# Patient Record
Sex: FEMALE | Race: WHITE | NOT HISPANIC OR LATINO | Employment: OTHER | URBAN - METROPOLITAN AREA
[De-identification: names, ages, dates, MRNs, and addresses within clinical notes are randomized per-mention and may not be internally consistent; named-entity substitution may affect disease eponyms.]

---

## 2023-03-27 ENCOUNTER — HOSPITAL ENCOUNTER (OUTPATIENT)
Dept: DATA CONVERSION | Facility: HOSPITAL | Age: 78
End: 2023-03-27
Attending: INTERNAL MEDICINE
Payer: MEDICARE

## 2023-03-27 DIAGNOSIS — K21.00 GASTRO-ESOPHAGEAL REFLUX DISEASE WITH ESOPHAGITIS, WITHOUT BLEEDING: ICD-10-CM

## 2023-03-27 DIAGNOSIS — K59.00 CONSTIPATION, UNSPECIFIED: ICD-10-CM

## 2023-03-27 DIAGNOSIS — K21.9 GASTRO-ESOPHAGEAL REFLUX DISEASE WITHOUT ESOPHAGITIS: ICD-10-CM

## 2023-03-27 DIAGNOSIS — E78.00 PURE HYPERCHOLESTEROLEMIA, UNSPECIFIED: ICD-10-CM

## 2023-03-27 DIAGNOSIS — R10.9 UNSPECIFIED ABDOMINAL PAIN: ICD-10-CM

## 2023-03-27 DIAGNOSIS — Z12.11 ENCOUNTER FOR SCREENING FOR MALIGNANT NEOPLASM OF COLON: ICD-10-CM

## 2023-03-27 DIAGNOSIS — E07.9 DISORDER OF THYROID, UNSPECIFIED: ICD-10-CM

## 2023-03-27 DIAGNOSIS — E78.5 HYPERLIPIDEMIA, UNSPECIFIED: ICD-10-CM

## 2023-03-27 DIAGNOSIS — K57.30 DIVERTICULOSIS OF LARGE INTESTINE WITHOUT PERFORATION OR ABSCESS WITHOUT BLEEDING: ICD-10-CM

## 2023-03-27 DIAGNOSIS — Z87.891 PERSONAL HISTORY OF NICOTINE DEPENDENCE: ICD-10-CM

## 2023-03-27 DIAGNOSIS — K22.89 OTHER SPECIFIED DISEASE OF ESOPHAGUS: ICD-10-CM

## 2023-03-27 DIAGNOSIS — J45.909 UNSPECIFIED ASTHMA, UNCOMPLICATED (HHS-HCC): ICD-10-CM

## 2023-11-13 ENCOUNTER — HOSPITAL ENCOUNTER (OUTPATIENT)
Dept: VASCULAR MEDICINE | Facility: HOSPITAL | Age: 78
Discharge: HOME | End: 2023-11-13
Payer: MEDICARE

## 2023-11-13 DIAGNOSIS — R22.42 LOCALIZED SWELLING, MASS AND LUMP, LEFT LOWER LIMB: ICD-10-CM

## 2023-11-13 DIAGNOSIS — R60.1 GENERALIZED EDEMA: ICD-10-CM

## 2023-11-13 PROCEDURE — 93971 EXTREMITY STUDY: CPT | Performed by: SURGERY

## 2023-11-13 PROCEDURE — 93971 EXTREMITY STUDY: CPT

## 2023-11-30 ENCOUNTER — ANCILLARY PROCEDURE (OUTPATIENT)
Dept: RADIOLOGY | Facility: CLINIC | Age: 78
End: 2023-11-30
Payer: MEDICARE

## 2023-11-30 DIAGNOSIS — M79.605 PAIN IN LEFT LEG: ICD-10-CM

## 2023-11-30 DIAGNOSIS — R26.89 OTHER ABNORMALITIES OF GAIT AND MOBILITY: ICD-10-CM

## 2023-11-30 DIAGNOSIS — G83.10: ICD-10-CM

## 2023-11-30 PROCEDURE — 72148 MRI LUMBAR SPINE W/O DYE: CPT

## 2023-11-30 PROCEDURE — 72148 MRI LUMBAR SPINE W/O DYE: CPT | Performed by: RADIOLOGY

## 2023-12-01 ENCOUNTER — APPOINTMENT (OUTPATIENT)
Dept: UROLOGY | Facility: HOSPITAL | Age: 78
End: 2023-12-01
Payer: MEDICARE

## 2023-12-12 ENCOUNTER — OFFICE VISIT (OUTPATIENT)
Dept: NEUROSURGERY | Facility: CLINIC | Age: 78
End: 2023-12-12
Payer: MEDICARE

## 2023-12-12 ENCOUNTER — HOSPITAL ENCOUNTER (OUTPATIENT)
Dept: RADIOLOGY | Facility: HOSPITAL | Age: 78
Discharge: HOME | End: 2023-12-12
Payer: MEDICARE

## 2023-12-12 VITALS
TEMPERATURE: 97 F | DIASTOLIC BLOOD PRESSURE: 72 MMHG | WEIGHT: 195 LBS | SYSTOLIC BLOOD PRESSURE: 107 MMHG | HEART RATE: 63 BPM | BODY MASS INDEX: 30.61 KG/M2 | HEIGHT: 67 IN

## 2023-12-12 DIAGNOSIS — M25.562 LEFT KNEE PAIN, UNSPECIFIED CHRONICITY: ICD-10-CM

## 2023-12-12 DIAGNOSIS — M25.562 LEFT KNEE PAIN, UNSPECIFIED CHRONICITY: Primary | ICD-10-CM

## 2023-12-12 DIAGNOSIS — M51.16 LUMBAR DISC DISEASE WITH RADICULOPATHY: ICD-10-CM

## 2023-12-12 PROCEDURE — 99203 OFFICE O/P NEW LOW 30 MIN: CPT | Performed by: NURSE PRACTITIONER

## 2023-12-12 PROCEDURE — 73562 X-RAY EXAM OF KNEE 3: CPT | Mod: LEFT SIDE | Performed by: STUDENT IN AN ORGANIZED HEALTH CARE EDUCATION/TRAINING PROGRAM

## 2023-12-12 PROCEDURE — 72110 X-RAY EXAM L-2 SPINE 4/>VWS: CPT | Performed by: STUDENT IN AN ORGANIZED HEALTH CARE EDUCATION/TRAINING PROGRAM

## 2023-12-12 PROCEDURE — 73562 X-RAY EXAM OF KNEE 3: CPT | Mod: LT,FY

## 2023-12-12 PROCEDURE — 72120 X-RAY BEND ONLY L-S SPINE: CPT

## 2023-12-12 PROCEDURE — 1036F TOBACCO NON-USER: CPT | Performed by: NURSE PRACTITIONER

## 2023-12-12 PROCEDURE — 1159F MED LIST DOCD IN RCRD: CPT | Performed by: NURSE PRACTITIONER

## 2023-12-12 PROCEDURE — 99213 OFFICE O/P EST LOW 20 MIN: CPT | Performed by: NURSE PRACTITIONER

## 2023-12-12 PROCEDURE — 1125F AMNT PAIN NOTED PAIN PRSNT: CPT | Performed by: NURSE PRACTITIONER

## 2023-12-12 RX ORDER — ALPRAZOLAM 0.5 MG/1
0.5 TABLET ORAL
COMMUNITY
Start: 2023-04-20

## 2023-12-12 RX ORDER — MELATONIN 10 MG/ML
DROPS ORAL
COMMUNITY

## 2023-12-12 RX ORDER — ESTRADIOL 0.1 MG/G
CREAM VAGINAL
COMMUNITY
Start: 2022-03-28

## 2023-12-12 NOTE — PROGRESS NOTES
Chief Complaint:   Irasema Jaime is a 78 y.o. year old woman here for lower back pain.    HPI  Irasema Jaime is a very nice 78 year old female with Hx of osteopenia, myalgia, remote L3 compression fracture who has acute lower back pain radiating down LLE that started in early November 2023. Onset sudden and severe, causing pain so severe she couldn't walk. She still has trouble getting up steps. She also reports left knee pain that contributes to her mobility difficulties, sometimes feels as though her left knee is giving out or will buckle. She denies bowel or bladder dysfunction, weakness, or saddle anesthesia. She take NSAID occasionally without relief.     Review of Systems  All other systems reviewed and negative other than what is already stated in this note.      Past Medical History:   Diagnosis Date    Acute pharyngitis, unspecified     Sore throat    Personal history of other diseases of the digestive system     History of esophageal reflux       There is no problem list on file for this patient.      Past Surgical History:   Procedure Laterality Date    CHOLECYSTECTOMY  08/25/2015    Cholecystectomy       No family history on file.    Social History     Tobacco Use    Smoking status: Never    Smokeless tobacco: Never   Substance Use Topics    Alcohol use: Never    Drug use: Never         Current Outpatient Medications:     ALPRAZolam (Xanax) 0.5 mg tablet, Take 1 tablet (0.5 mg) by mouth., Disp: , Rfl:     cholecalciferol, vitamin D3, 50 mcg (2,000 unit) tablet,chewable, Chew., Disp: , Rfl:     estradiol (Estrace) 0.01 % (0.1 mg/gram) vaginal cream, Apply a pea size amount to finger and place into vaginal opening every Monday, Wednesday, and Friday., Disp: , Rfl:         Objective   Vitals:    12/12/23 1411   BP: 107/72   Pulse: 63   Temp: 36.1 °C (97 °F)     Exam  no acute distress, well developed woman appearing her  stated age  normal sclera  moist mucus membranes  no peripheral edema   symmetric chest  rise  nondistended abdomen  alert and oriented, pupils equal and round, extraocular movements intact.  Full strength in all extremities, normal sensation to light touch throughout, normal symmetric reflexes  left SI joint and left knee tenderness to palpation   normal mood    Study Result    Narrative & Impression   Interpreted By:  Adali Murray,   STUDY:  MRI of the lumbar spine without IV contrast;  11/30/2023 7:10 pm      INDICATION:  Signs/Symptoms:PAIN.      COMPARISON:  None.      ACCESSION NUMBER(S):  [id number]      ORDERING CLINICIAN:  TJ SANDS      TECHNIQUE:  Sagittal and axial STIR and T1-weighted MRI images of the lumbar  spine were acquired using a spondylolysis protocol.  No contrast was  administered.      FINDINGS:  For counting purposes the last lumbarized vertebral body is labeled  L5. Alignment, vertebral body heights and marrow signal pattern are  within normal limits. There is desiccated disc signal throughout the  lumbar spine with mild disc height loss at L5-S1. The conus  terminates at L1 and is unremarkable. Evaluation of the paraspinal  soft tissues is unremarkable.      Evaluation by level:      T12-L1: No spinal canal or neural foraminal stenosis.      T12-L1: No spinal canal or neural foraminal stenosis.      L1-L2: No spinal canal or neural foraminal stenosis.      L2-L3: Mild disc bulge and facet arthrosis. Mild spinal canal  stenosis. Mild neural foraminal stenosis.      L3-L4: Disc bulge, facet arthrosis and ligamentum flavum thickening.  Mild-to-moderate spinal canal stenosis, narrowing of the subarticular  recess and mild bilateral neural foraminal stenosis.      L4-L5: Disc bulge, facet arthrosis and ligamentum flavum thickening.  Mild spinal canal stenosis, mild narrowing of the subarticular recess  and mild bilateral neural foraminal stenosis.      L5-S1: No significant spinal canal or neural foraminal stenosis.      IMPRESSION:  Multilevel degenerative disc disease and  facet arthrosis most  pronounced at L3-L4 with mild-to-moderate spinal canal and mild  bilateral neural foraminal stenosis.      I personally reviewed the images/study and I agree with the findings  as stated. This study was interpreted at [facility], [place], [place].      MACRO:  None      Signed by: Adali Murray 12/1/2023 11:15 AM  Dictation workstation:   [id number]       Assessment/Plan   There were no encounter diagnoses.  And Addison is a very nice 78-year-old woman here for evaluation of acute lower back pain started about 4 weeks ago.  She saw her primary care provider who got an MRI dated 11/30/2023, this was personally reviewed and demonstrates some mild to moderate spinal canal stenosis at L3-4 with bilateral neural foraminal narrowing secondary to broad-based disc bulge, as well as L4-5 mild spinal canal stenosis and neural foraminal narrowing bilaterally secondary to broad-based disc bulge.  Exam is within normal limits, no weakness or loss of sensation.  She does exhibit some point tenderness at the left SI joint as well as on palpation of her left kneeAt this time recommend conservative medical therapies including physical therapy, pain management referral, OTC NSAIDs/Tylenol/topical analgesic medications.  Plan:  -Conservative medical therapies as above  -Referral placed to PT and PM  -Standing upright with flexion extension x-rays of lumbar spine today  -left knee x-rays today  -Return visit if conservative therapies do not provide relief or in fact her symptoms worsen.      Nuris Perry, APRN-CNP     This note was created in part after personal review of documents in EMR including recent labs and available radiologic imaging. Total time spent in review of EMR, relevant imaging, time with patient and completion of this document is 30 minutes.

## 2023-12-12 NOTE — PATIENT INSTRUCTIONS
I recommend conservative medical management at this time for multiple problems including lumbar radiculopathy, sacroiliitis, and left knee pain.  This includes over-the-counter Tylenol and naproxen sodium twice a day, topical pain relief medications.    I referred you to physical therapy and pain management.  We will get lumbar spine and left knee x-rays today, I will call you if there is anything concerning.  Results should be available on [facility].  Please return for follow-up visit if your symptoms do not improve with this conservative management or in fact they worsen.   Nuris Perry, APRN-CNP

## 2023-12-18 ENCOUNTER — APPOINTMENT (OUTPATIENT)
Dept: ORTHOPEDIC SURGERY | Facility: HOSPITAL | Age: 78
End: 2023-12-18
Payer: MEDICARE

## 2023-12-19 ENCOUNTER — APPOINTMENT (OUTPATIENT)
Dept: UROLOGY | Facility: CLINIC | Age: 78
End: 2023-12-19
Payer: MEDICARE

## 2024-01-08 ENCOUNTER — OFFICE VISIT (OUTPATIENT)
Dept: PAIN MEDICINE | Facility: HOSPITAL | Age: 79
End: 2024-01-08
Payer: MEDICARE

## 2024-01-08 ENCOUNTER — HOSPITAL ENCOUNTER (OUTPATIENT)
Dept: RADIOLOGY | Facility: HOSPITAL | Age: 79
Discharge: HOME | End: 2024-01-08
Payer: MEDICARE

## 2024-01-08 DIAGNOSIS — M25.562 LEFT KNEE PAIN, UNSPECIFIED CHRONICITY: ICD-10-CM

## 2024-01-08 DIAGNOSIS — M51.16 LUMBAR DISC DISEASE WITH RADICULOPATHY: Primary | ICD-10-CM

## 2024-01-08 DIAGNOSIS — M51.16 LUMBAR DISC DISEASE WITH RADICULOPATHY: ICD-10-CM

## 2024-01-08 PROCEDURE — 1125F AMNT PAIN NOTED PAIN PRSNT: CPT | Performed by: ANESTHESIOLOGY

## 2024-01-08 PROCEDURE — 1036F TOBACCO NON-USER: CPT | Performed by: ANESTHESIOLOGY

## 2024-01-08 PROCEDURE — 99214 OFFICE O/P EST MOD 30 MIN: CPT | Performed by: ANESTHESIOLOGY

## 2024-01-08 PROCEDURE — 99204 OFFICE O/P NEW MOD 45 MIN: CPT | Performed by: ANESTHESIOLOGY

## 2024-01-08 PROCEDURE — 73523 X-RAY EXAM HIPS BI 5/> VIEWS: CPT | Mod: BILATERAL PROCEDURE | Performed by: RADIOLOGY

## 2024-01-08 PROCEDURE — 73523 X-RAY EXAM HIPS BI 5/> VIEWS: CPT

## 2024-01-08 RX ORDER — TOPIRAMATE 25 MG/1
25-50 TABLET ORAL NIGHTLY
Qty: 180 TABLET | Refills: 3 | Status: SHIPPED | OUTPATIENT
Start: 2024-01-08 | End: 2025-01-07

## 2024-01-08 ASSESSMENT — PAIN SCALES - GENERAL: PAINLEVEL_OUTOF10: 9

## 2024-01-08 ASSESSMENT — PAIN - FUNCTIONAL ASSESSMENT: PAIN_FUNCTIONAL_ASSESSMENT: 0-10

## 2024-01-08 NOTE — PROGRESS NOTES
History Of Present Illness  Irasema Jaime is a 78 y.o. female presenting as a new patient pain clinic today referred to us by neurosurgery, for low back pain with radiculopathy.  Patient's past medical history significant for osteopenia. Patient started physical therapy, and underwent MRI ordered by her primary care physician patient says worst of her pain is on transition when she gets into out of a car, however she does notice worsening left lower radicular pain when she is ambulating from the parking lot requiring her to sit down, which alleviates her pain.  Previously took Tylenol and NSAIDs but stopped due to stomach pain.     Past Medical History  Past Medical History:   Diagnosis Date    Acute pharyngitis, unspecified     Sore throat    Personal history of other diseases of the digestive system     History of esophageal reflux       Surgical History  Past Surgical History:   Procedure Laterality Date    CHOLECYSTECTOMY  08/25/2015    Cholecystectomy        Social History  She reports that she has never smoked. She has never used smokeless tobacco. She reports that she does not drink alcohol and does not use drugs.    Family History  No family history on file.     Allergies  Anesthetics - amide type - select amino amides, Levofloxacin, Metronidazole hcl, Phenylephrine compound, Sulfamethoxazole-trimethoprim, Tegaserod, Latex, and Nitrofurantoin monohyd/m-cryst    Review of Systems   13 point ROS done and negative except for the above.   Physical Exam    PHYSICAL EXAM  Vitals signs reviewed  Constitutional:    General: Not in acute distress   Appearance: Normal appearance. Not ill-appearing.  HENT:   Head: Normocephalic and atraumatic  Eyes:   Conjunctiva/sclera normal  Cardiovascular:  No jugular venous distention bilaterally  No gross edema in lower extremities  Pulmonary:   Effort: No respiratory distress  Abdominal:  Abdomen appears nondistended  Musculoskeletal:   SI point tenderness was exquisite on the  left  Mati finger test was positive the left  FIDELINA test was negative on the left  Distraction test was positive the left  Gaenslen's test was positive the left  Skin:   General: Skin is warm and dry  Neurological:  Straight leg test was negative bilaterally  Sensation to pinprick was decreased over bilateral medial malleoli  Patellar reflexes were 2+ bilaterally  Kassandra exam was negative bilaterally  Toe ambulation was intact  Heel ambulation was intact  Psychiatric:    Mood and Affect: Mood normal    Behavior: Behavior normal    Last Recorded Vitals  There were no vitals taken for this visit.    Relevant Results        ASSESSMENT:  Assessment/Plan   Problem List Items Addressed This Visit    None  Visit Diagnoses         Codes    Left knee pain, unspecified chronicity     M25.562    Lumbar disc disease with radiculopathy     M51.16            Patient is 78-year-old female presents as a new patient in pain clinic at a referred to us by neurosurgery for left-sided buttock pain as well as left leg pain.  L-spine MRI from 11/30/2023 reviewed, notable for mild/moderate central stenosis at L3/L4 secondary to disc bulge as well as ligamentum flavum hypertrophy, as well as mild/moderate central stenosis at L4/L5 secondary to small disc bulge and ligamentum flavum hypertrophy.  No significant foraminal stenosis.  Mild osteoarthritis of left knee on plain film.  Patient has exam findings that are mixed picture of both lumbar spinal stenosis given decreased sensation to pinprick over medial malleoli as well as positive SI exam findings.  Patient has both pain with ambulation that is improved with sitting down, as well as exquisite pain with transition.  Will schedule for L3/L4 lumbar interlaminar steroid injection with left-sided bias with half dose of methylprednisolone given osteopenia, and if refractory will schedule for half dose left-sided sacroiliac joint junction.  Patient completed 8 weeks of physical therapy and  is currently actively enrolled.    PLAN:  - Start low-dose topiramate at nighttime.  Goals of therapy, the dosages and side effects of the medication were discussed in detail with the patient. The patient understands and agrees to take the medication as prescribed.   - Order bilateral plain films of hip  - Schedule for L3/L4 lumbar interlaminar epidural steroid injection with left-sided bias at half dose (20 mg) methylprednisolone.  Risks, benefits and alternatives of the procedure were discussed with the patient who expressed understanding and agrees to proceed.   - If refractory, schedule for left sacroiliac steroid injection    The plan was discussed with the patient and they were invited to contact us back anytime with any questions or concerns and follow-up with us in the office as needed.    Roberto Carlos Gold MD

## 2024-01-23 ENCOUNTER — APPOINTMENT (OUTPATIENT)
Dept: RADIOLOGY | Facility: HOSPITAL | Age: 79
End: 2024-01-23
Payer: MEDICARE

## 2024-02-23 ENCOUNTER — APPOINTMENT (OUTPATIENT)
Dept: CARDIOLOGY | Facility: HOSPITAL | Age: 79
End: 2024-02-23
Payer: MEDICARE

## 2024-02-23 ENCOUNTER — HOSPITAL ENCOUNTER (EMERGENCY)
Facility: HOSPITAL | Age: 79
Discharge: HOME | End: 2024-02-23
Attending: EMERGENCY MEDICINE
Payer: MEDICARE

## 2024-02-23 VITALS
TEMPERATURE: 97.6 F | DIASTOLIC BLOOD PRESSURE: 73 MMHG | RESPIRATION RATE: 18 BRPM | BODY MASS INDEX: 29.55 KG/M2 | WEIGHT: 195 LBS | HEART RATE: 60 BPM | HEIGHT: 68 IN | SYSTOLIC BLOOD PRESSURE: 124 MMHG | OXYGEN SATURATION: 98 %

## 2024-02-23 DIAGNOSIS — T78.40XA ALLERGIC REACTION, INITIAL ENCOUNTER: Primary | ICD-10-CM

## 2024-02-23 LAB
ALBUMIN SERPL BCP-MCNC: 4.3 G/DL (ref 3.4–5)
ALP SERPL-CCNC: 55 U/L (ref 33–136)
ALT SERPL W P-5'-P-CCNC: 9 U/L (ref 7–45)
ANION GAP SERPL CALC-SCNC: 11 MMOL/L (ref 10–20)
AST SERPL W P-5'-P-CCNC: 13 U/L (ref 9–39)
BASOPHILS # BLD AUTO: 0 X10*3/UL (ref 0–0.1)
BASOPHILS NFR BLD AUTO: 0 %
BILIRUB SERPL-MCNC: 0.5 MG/DL (ref 0–1.2)
BUN SERPL-MCNC: 15 MG/DL (ref 6–23)
CALCIUM SERPL-MCNC: 9.2 MG/DL (ref 8.6–10.3)
CARDIAC TROPONIN I PNL SERPL HS: 9 NG/L (ref 0–13)
CHLORIDE SERPL-SCNC: 100 MMOL/L (ref 98–107)
CO2 SERPL-SCNC: 26 MMOL/L (ref 21–32)
CREAT SERPL-MCNC: 0.89 MG/DL (ref 0.5–1.05)
EGFRCR SERPLBLD CKD-EPI 2021: 67 ML/MIN/1.73M*2
EOSINOPHIL # BLD AUTO: 0.07 X10*3/UL (ref 0–0.4)
EOSINOPHIL NFR BLD AUTO: 0.7 %
ERYTHROCYTE [DISTWIDTH] IN BLOOD BY AUTOMATED COUNT: 13.1 % (ref 11.5–14.5)
GLUCOSE SERPL-MCNC: 133 MG/DL (ref 74–99)
HCT VFR BLD AUTO: 37.9 % (ref 36–46)
HGB BLD-MCNC: 12.9 G/DL (ref 12–16)
IMM GRANULOCYTES # BLD AUTO: 0.04 X10*3/UL (ref 0–0.5)
IMM GRANULOCYTES NFR BLD AUTO: 0.4 % (ref 0–0.9)
LIPASE SERPL-CCNC: 31 U/L (ref 9–82)
LYMPHOCYTES # BLD AUTO: 1.71 X10*3/UL (ref 0.8–3)
LYMPHOCYTES NFR BLD AUTO: 17.4 %
MCH RBC QN AUTO: 30.8 PG (ref 26–34)
MCHC RBC AUTO-ENTMCNC: 34 G/DL (ref 32–36)
MCV RBC AUTO: 91 FL (ref 80–100)
MONOCYTES # BLD AUTO: 0.68 X10*3/UL (ref 0.05–0.8)
MONOCYTES NFR BLD AUTO: 6.9 %
NEUTROPHILS # BLD AUTO: 7.33 X10*3/UL (ref 1.6–5.5)
NEUTROPHILS NFR BLD AUTO: 74.6 %
NRBC BLD-RTO: 0 /100 WBCS (ref 0–0)
PLATELET # BLD AUTO: 322 X10*3/UL (ref 150–450)
POTASSIUM SERPL-SCNC: 3.9 MMOL/L (ref 3.5–5.3)
PROT SERPL-MCNC: 7.3 G/DL (ref 6.4–8.2)
RBC # BLD AUTO: 4.19 X10*6/UL (ref 4–5.2)
SODIUM SERPL-SCNC: 133 MMOL/L (ref 136–145)
WBC # BLD AUTO: 9.8 X10*3/UL (ref 4.4–11.3)

## 2024-02-23 PROCEDURE — 83690 ASSAY OF LIPASE: CPT | Performed by: NURSE PRACTITIONER

## 2024-02-23 PROCEDURE — 93005 ELECTROCARDIOGRAM TRACING: CPT

## 2024-02-23 PROCEDURE — 80053 COMPREHEN METABOLIC PANEL: CPT | Performed by: NURSE PRACTITIONER

## 2024-02-23 PROCEDURE — 36415 COLL VENOUS BLD VENIPUNCTURE: CPT | Performed by: NURSE PRACTITIONER

## 2024-02-23 PROCEDURE — 2500000004 HC RX 250 GENERAL PHARMACY W/ HCPCS (ALT 636 FOR OP/ED): Performed by: NURSE PRACTITIONER

## 2024-02-23 PROCEDURE — 85025 COMPLETE CBC W/AUTO DIFF WBC: CPT | Performed by: NURSE PRACTITIONER

## 2024-02-23 PROCEDURE — 84484 ASSAY OF TROPONIN QUANT: CPT | Performed by: NURSE PRACTITIONER

## 2024-02-23 PROCEDURE — 96375 TX/PRO/DX INJ NEW DRUG ADDON: CPT

## 2024-02-23 PROCEDURE — 96374 THER/PROPH/DIAG INJ IV PUSH: CPT

## 2024-02-23 PROCEDURE — 99284 EMERGENCY DEPT VISIT MOD MDM: CPT | Mod: 25

## 2024-02-23 RX ORDER — ACETAMINOPHEN 325 MG/1
975 TABLET ORAL ONCE
Status: COMPLETED | OUTPATIENT
Start: 2024-02-23 | End: 2024-02-23

## 2024-02-23 RX ORDER — DIPHENHYDRAMINE HYDROCHLORIDE 50 MG/ML
50 INJECTION INTRAMUSCULAR; INTRAVENOUS ONCE
Status: COMPLETED | OUTPATIENT
Start: 2024-02-23 | End: 2024-02-23

## 2024-02-23 RX ORDER — PREDNISONE 20 MG/1
20 TABLET ORAL DAILY
Qty: 5 TABLET | Refills: 0 | Status: SHIPPED | OUTPATIENT
Start: 2024-02-23 | End: 2024-02-28

## 2024-02-23 RX ORDER — DIPHENHYDRAMINE HCL 25 MG
25 CAPSULE ORAL 2 TIMES DAILY
Qty: 14 CAPSULE | Refills: 0 | Status: SHIPPED | OUTPATIENT
Start: 2024-02-23 | End: 2024-03-01

## 2024-02-23 RX ADMIN — ACETAMINOPHEN 975 MG: 325 TABLET ORAL at 10:01

## 2024-02-23 RX ADMIN — METHYLPREDNISOLONE SODIUM SUCCINATE 125 MG: 125 INJECTION, POWDER, FOR SOLUTION INTRAMUSCULAR; INTRAVENOUS at 09:24

## 2024-02-23 RX ADMIN — DIPHENHYDRAMINE HYDROCHLORIDE 50 MG: 50 INJECTION, SOLUTION INTRAMUSCULAR; INTRAVENOUS at 09:24

## 2024-02-23 ASSESSMENT — PAIN - FUNCTIONAL ASSESSMENT
PAIN_FUNCTIONAL_ASSESSMENT: 0-10
PAIN_FUNCTIONAL_ASSESSMENT: 0-10

## 2024-02-23 ASSESSMENT — PAIN SCALES - GENERAL
PAINLEVEL_OUTOF10: 0 - NO PAIN
PAINLEVEL_OUTOF10: 6

## 2024-02-23 ASSESSMENT — COLUMBIA-SUICIDE SEVERITY RATING SCALE - C-SSRS
2. HAVE YOU ACTUALLY HAD ANY THOUGHTS OF KILLING YOURSELF?: NO
6. HAVE YOU EVER DONE ANYTHING, STARTED TO DO ANYTHING, OR PREPARED TO DO ANYTHING TO END YOUR LIFE?: NO
1. IN THE PAST MONTH, HAVE YOU WISHED YOU WERE DEAD OR WISHED YOU COULD GO TO SLEEP AND NOT WAKE UP?: NO

## 2024-02-23 ASSESSMENT — PAIN DESCRIPTION - LOCATION: LOCATION: HEAD

## 2024-02-23 NOTE — ED PROVIDER NOTES
HPI   Chief Complaint   Patient presents with    Allergic Reaction       78-year-old female who has multiple allergies to anesthetics, levofloxacin, metronidazole, phenyl Afrin, Bactrim, latex, Macrobid, presents today after she developed a rash throughout her body 50 minutes after she took Protonix.  She denies dyspnea.  She denies pharyngitis.  She denies chest pain.  She denies abdominal pain, nausea, or vomiting.  She states that there is a mass in her pancreas that they are following.  She has a history of a cholecystectomy in 1983 and the reason she took the Protonix that she was experiencing right upper quadrant pain.  She denies any recent trauma or fall.  Vital signs are stable in triage but she is tachycardic and tachypneic which could be due to anxiety from the allergic reaction.      History provided by:  Patient and spouse   used: No                        Jerry Coma Scale Score: 15                     Patient History   Past Medical History:   Diagnosis Date    Acute pharyngitis, unspecified     Sore throat    Personal history of other diseases of the digestive system     History of esophageal reflux     Past Surgical History:   Procedure Laterality Date    CHOLECYSTECTOMY  08/25/2015    Cholecystectomy     No family history on file.  Social History     Tobacco Use    Smoking status: Never    Smokeless tobacco: Never   Substance Use Topics    Alcohol use: Never    Drug use: Never       Physical Exam   ED Triage Vitals [02/23/24 0849]   Temperature Heart Rate Respirations BP   36.4 °C (97.6 °F) (!) 105 (!) 22 (!) 147/103      Pulse Ox Temp src Heart Rate Source Patient Position   96 % -- -- --      BP Location FiO2 (%)     -- --       Physical Exam  Constitutional:       Appearance: Normal appearance.   HENT:      Head: Normocephalic and atraumatic.      Right Ear: Tympanic membrane normal.      Left Ear: Tympanic membrane normal.      Nose: Nose normal.      Mouth/Throat:       Mouth: Mucous membranes are dry.   Eyes:      Extraocular Movements: Extraocular movements intact.      Pupils: Pupils are equal, round, and reactive to light.   Cardiovascular:      Rate and Rhythm: Normal rate and regular rhythm.      Pulses: Normal pulses.      Heart sounds: Normal heart sounds.   Pulmonary:      Effort: Pulmonary effort is normal.      Breath sounds: Normal breath sounds.   Abdominal:      General: Abdomen is flat.      Palpations: Abdomen is soft.   Musculoskeletal:         General: Normal range of motion.      Cervical back: Normal range of motion and neck supple.   Skin:     Capillary Refill: Capillary refill takes less than 2 seconds.      Findings: Rash present.   Neurological:      General: No focal deficit present.      Mental Status: She is alert and oriented to person, place, and time.   Psychiatric:         Mood and Affect: Mood normal.         Behavior: Behavior normal.         ED Course & MDM   Diagnoses as of 02/23/24 1207   Allergic reaction, initial encounter       Medical Decision Making  I did not give patient epinephrine at first since the oropharynx was wide open and there was no stridor.  I gave her Benadryl and Solu-Medrol to start to see how she responds.  I immediately staffed with attending.  Patient was watched in our emergency department for approximately 3 hours and was improved drastically.  She was very comfortable and requesting discharge home.  She will use prednisone for 5 days and Benadryl.  I gave her an EpiPen and sent that to her pharmacy.  Follow-up with PCP as needed.  Careful return precautions.  Her cardiac enzyme was normal.  Her lipase was normal.  The reason I ordered a lipase is she indicated that she was told she had a mass on her pancreas and I wanted to rule out acute pancreatitis.  She was now denying any pain and was very comfortable and both patient and  were very pleased with care both from Dr. George and myself.  Metabolic panel was  essentially normal.  CBC had no leukocytosis or left shift.  EKG was normal sinus rhythm at 69 bpm.  ME interval was normal.  QT corrected was normal.  No ST elevation or depression.    Amount and/or Complexity of Data Reviewed  Labs: ordered.  ECG/medicine tests: ordered and independent interpretation performed.     Details: EKG was 69 bpm.  ME interval was normal.  QT corrected was normal.  Interpreted both by attending and myself.  No ST elevation or depression.        Procedure  Procedures     Chente Wen, AARON-CNP  02/23/24 4022

## 2024-02-23 NOTE — ED NOTES
"Assumed pt care @ 0914. C/o allergic reaction from taking Protonix... pt has all over rash. Denies any chest pain, sob, change in vision. Pt is complaining of a frontal headache. Pt states on arrival that they took \"Childrens liquid benadryl, but is unsure of how much was taken.\" Pt states not having an epi pen. Pt placed on cardiac monitor, continuous pulse ox, IV placed & EKG completed per order.  at bedside. Pt stating sge gas sine upper right quad discomfort.      Amina Pineda RN  02/23/24 0964       Amina Pineda RN  02/23/24 0976    "

## 2024-02-23 NOTE — ED TRIAGE NOTES
"C/O RASH AFTER TAKING PROTONIX, C/O URQ PAIN AFTER EATING, GALLBLADDER REMOVED IN 1983, AIRWAY INTACT, MAINTAINING SECRETIONS, PT TOOK BENADRYL PRIOR TO ARRIVAL, PT STATES \"I DO NOT KNOW HOW MUCH BENADRYL I TOOK I JUST PUT IT TO MY MOUTH\"   "

## 2024-02-23 NOTE — ED NOTES
Pt resting in bed, pt states that she is feeling better at this time.      Amarjit Potts, RN  02/23/24 0505

## 2024-02-24 LAB
ATRIAL RATE: 69 BPM
P AXIS: 84 DEGREES
P OFFSET: 188 MS
P ONSET: 125 MS
PR INTERVAL: 194 MS
Q ONSET: 222 MS
QRS COUNT: 11 BEATS
QRS DURATION: 86 MS
QT INTERVAL: 382 MS
QTC CALCULATION(BAZETT): 409 MS
QTC FREDERICIA: 400 MS
R AXIS: 77 DEGREES
T AXIS: 66 DEGREES
T OFFSET: 413 MS
VENTRICULAR RATE: 69 BPM

## 2024-05-24 ENCOUNTER — OFFICE VISIT (OUTPATIENT)
Dept: ENDOCRINOLOGY | Facility: CLINIC | Age: 79
End: 2024-05-24
Payer: MEDICARE

## 2024-05-24 VITALS
DIASTOLIC BLOOD PRESSURE: 74 MMHG | WEIGHT: 202 LBS | SYSTOLIC BLOOD PRESSURE: 128 MMHG | HEART RATE: 74 BPM | BODY MASS INDEX: 30.97 KG/M2

## 2024-05-24 DIAGNOSIS — R73.01 IMPAIRED FASTING GLUCOSE: ICD-10-CM

## 2024-05-24 DIAGNOSIS — E04.2 NONTOXIC MULTINODULAR GOITER: ICD-10-CM

## 2024-05-24 DIAGNOSIS — E78.00 PURE HYPERCHOLESTEROLEMIA: ICD-10-CM

## 2024-05-24 DIAGNOSIS — E88.819 INSULIN RESISTANCE: Primary | ICD-10-CM

## 2024-05-24 PROCEDURE — 1159F MED LIST DOCD IN RCRD: CPT | Performed by: INTERNAL MEDICINE

## 2024-05-24 PROCEDURE — 99214 OFFICE O/P EST MOD 30 MIN: CPT | Performed by: INTERNAL MEDICINE

## 2024-05-24 RX ORDER — PHENTERMINE HYDROCHLORIDE 37.5 MG/1
37.5 CAPSULE ORAL
Qty: 30 CAPSULE | Refills: 2 | Status: SHIPPED | OUTPATIENT
Start: 2024-05-24 | End: 2024-08-22

## 2024-05-24 NOTE — PROGRESS NOTES
Patient ID: Irasema Jaime is a 79 y.o. female who presents for Follow-up.  HPI  The patient comes in for follow up.    She was last seen April 27, 2022.    She had a TI-RADS four 2.2 x 2.0 x 1.5 cm nodule noted November 2019 and had fine-needle aspiration in January 2020 that was consistent with benign follicular nodule.    She had impaired fasting glucose insulin resistance hyperlipidemia pancreatic exocrine insufficiency.    We tried her on metformin and metformin ER which she did feel helped but she did not tolerate.    More recently in March she had an ultrasound which revealed a TI-RADS three 3.2 x 2.9 x 2.2 cm nodule that was significantly larger.    She was seen by endocrine surgery at the clinic in May and a repeat ultrasound suggested that it was 1.8 x 2.7 cm and only minimally enlarged.    She has had normal thyroid function.    She is frustrated with her weight.    Physically she has no other complaints.    ROS  Comprehensive review of systems is negative.    Objective   Physical Exam  Visit Vitals  /74   Pulse 74      Vitals:    05/24/24 1223   Weight: 91.6 kg (202 lb)      Body mass index is 30.97 kg/m².      Weight 202 up 4 pounds    Eyes normal  ENT normal. No adenopathy  Thyroid palpable 30 g smooth nodule on the left midpole unchanged  Chest clear to auscultation  Heart sounds are normal  Abdomen nontender. Bowel sounds normal. No organomegaly  Feet are okay    Current Outpatient Medications   Medication Sig Dispense Refill    ALPRAZolam (Xanax) 0.5 mg tablet Take 1 tablet (0.5 mg) by mouth.      cholecalciferol, vitamin D3, 50 mcg (2,000 unit) tablet,chewable Chew.      diphenhydrAMINE (BenadryL) 25 mg capsule Take 1 capsule (25 mg) by mouth 2 times a day for 7 days. 14 capsule 0    EPINEPHrine 0.3 mg/0.3 mL injection syringe Inject 0.3 mL (0.3 mg) into the muscle if needed for anaphylaxis. Inject into upper leg. Call 911 after use. 1 each 0    estradiol (Estrace) 0.01 % (0.1 mg/gram)  vaginal cream Apply a pea size amount to finger and place into vaginal opening every Monday, Wednesday, and Friday.      topiramate (Topamax) 25 mg tablet Take 1-2 tablets (25-50 mg) by mouth once daily at bedtime. 180 tablet 3     No current facility-administered medications for this visit.       Assessment/Plan     1.  Multinodular thyroid with a TI-RADS 4 nodule  2.  Insulin resistance  3.  Impaired fasting glucose  4.  Hyperlipidemia    With regards to the thyroid we discussed the discrepancies between the ultrasounds.    Will defer the more accurate measure to be from the surgeon and given that it is basically stable we will hold off on any other recommendations.    I advised we repeat an ultrasound in 1 year.    We discussed insulin resistance this pathophysiology and its impact.    She will work on diet and exercise.    Will prescribe phentermine 37.5 mg for the next 3 months with a goal of at least 5% weight loss at that time.    I have personally reviewed the OARRS report for this patient. This report is scanned into the electronic medical record. I consider the risks of abuse, dependence, addiction and diversion. I believe that is clinically appropriate for this patient to be prescribed this medication.    She will follow-up with me in 3 months sooner as needed.

## 2024-08-30 ENCOUNTER — APPOINTMENT (OUTPATIENT)
Dept: ENDOCRINOLOGY | Facility: CLINIC | Age: 79
End: 2024-08-30
Payer: MEDICARE

## 2024-09-06 ENCOUNTER — OFFICE VISIT (OUTPATIENT)
Dept: ORTHOPEDIC SURGERY | Facility: HOSPITAL | Age: 79
End: 2024-09-06
Payer: MEDICARE

## 2024-09-06 ENCOUNTER — HOSPITAL ENCOUNTER (OUTPATIENT)
Dept: RADIOLOGY | Facility: HOSPITAL | Age: 79
Discharge: HOME | End: 2024-09-06
Payer: MEDICARE

## 2024-09-06 VITALS — BODY MASS INDEX: 31.71 KG/M2 | WEIGHT: 202 LBS | HEIGHT: 67 IN

## 2024-09-06 DIAGNOSIS — M79.672 LEFT FOOT PAIN: Primary | ICD-10-CM

## 2024-09-06 DIAGNOSIS — M79.672 LEFT FOOT PAIN: ICD-10-CM

## 2024-09-06 DIAGNOSIS — S92.352A CLOSED DISPLACED FRACTURE OF FIFTH METATARSAL BONE OF LEFT FOOT, INITIAL ENCOUNTER: ICD-10-CM

## 2024-09-06 PROCEDURE — 1159F MED LIST DOCD IN RCRD: CPT | Performed by: ORTHOPAEDIC SURGERY

## 2024-09-06 PROCEDURE — 1160F RVW MEDS BY RX/DR IN RCRD: CPT | Performed by: ORTHOPAEDIC SURGERY

## 2024-09-06 PROCEDURE — 73630 X-RAY EXAM OF FOOT: CPT | Mod: LT

## 2024-09-06 PROCEDURE — 99214 OFFICE O/P EST MOD 30 MIN: CPT | Performed by: ORTHOPAEDIC SURGERY

## 2024-09-06 PROCEDURE — 1036F TOBACCO NON-USER: CPT | Performed by: ORTHOPAEDIC SURGERY

## 2024-09-06 PROCEDURE — 99204 OFFICE O/P NEW MOD 45 MIN: CPT | Performed by: ORTHOPAEDIC SURGERY

## 2024-09-06 ASSESSMENT — PAIN - FUNCTIONAL ASSESSMENT: PAIN_FUNCTIONAL_ASSESSMENT: NO/DENIES PAIN

## 2024-09-06 NOTE — PROGRESS NOTES
79-year-old female presenting for initial evaluation of a left fifth metatarsal base fracture.  About 3 weeks ago she tripped on a garden hose and injured her lateral left foot.  She was seen in emergency room at [facility].  She then had a secondary injury about 11 days after that injury in which she bruised her central 2 toes on the same foot.  Since then she has had a couple different boots due to some issues.  She is currently in a boot that fits her well and provides her support.  She presents with her significant other.  She still is some lateral foot pain that has been quite tender over the past few weeks.  She has been weightbearing and using a cane to offload at times.    The patients full medical history, surgical history, medications, allergies, family, medical history, social history, and a complete 30 point review of systems is documented in the medical record on the signed, scanned medical intake sheet or reviewed in the history of present illness.    Gen: The patient is alert and oriented ×3, is in no acute distress, and appear their stated age and weight.    Psychiatric: Mood and affect are appropriate.    Eyes: Sclera are white, and pupils are round and symmetric.    ENT: Mucous membranes are moist.     Neck: Supple. Thyroid is midline.    Respiratory: Respirations are nonlabored, chest rise is symmetric.    Cardiac: Rate is regular by palpation of distal pulses.     Abdomen: Nondistended.    Integument: No obvious cutaneous lesions are noted. No signs of lymphangitis. No signs of systemic edema.    Musculoskeletal evaluation of the left lower extremity demonstrates pretty exquisite tenderness to palpation of the lateral foot border at the base of the fifth metatarsal.  Ecchymosis is seen both in the toes and the posterior lateral heel.  Ankle and foot range of motion are well-preserved.  Sensation is intact to light touch in the tibial, sural, saphenous, superficial peroneal, and deep peroneal  nerve distributions. Foot is warm and well-perfused.    Multiple views of the left foot demonstrates a minimally displaced base of fifth metatarsal fracture in good alignment.    79-year-old female with a minimally displaced base of the left fifth metatarsal fracture.  She is done very well so far with conservative management.  I will continue that.  I will see back further x-ray about 6 weeks.  3 views left foot.  She can weight-bear as tolerated in the boot.  We discussed physical therapy as needed if needed but she should be all right with home exercise program.  Continue increasing activity as tolerated.  Vitamin D supplementation is recommended.  Wean from cane as tolerated.    Natural History reviewed. All questions answered. The patient was in agreement with the plan.      **This note was created using voice recognition software and was not corrected for typographical or grammatical errors.**

## 2024-10-02 ENCOUNTER — HOSPITAL ENCOUNTER (OUTPATIENT)
Dept: RADIOLOGY | Facility: CLINIC | Age: 79
Discharge: HOME | End: 2024-10-02
Payer: MEDICARE

## 2024-10-02 ENCOUNTER — OFFICE VISIT (OUTPATIENT)
Dept: ORTHOPEDIC SURGERY | Facility: CLINIC | Age: 79
End: 2024-10-02
Payer: MEDICARE

## 2024-10-02 VITALS — BODY MASS INDEX: 31.71 KG/M2 | HEIGHT: 67 IN | WEIGHT: 202 LBS

## 2024-10-02 DIAGNOSIS — S92.352A CLOSED DISPLACED FRACTURE OF FIFTH METATARSAL BONE OF LEFT FOOT, INITIAL ENCOUNTER: ICD-10-CM

## 2024-10-02 DIAGNOSIS — S92.352A CLOSED DISPLACED FRACTURE OF FIFTH METATARSAL BONE OF LEFT FOOT, INITIAL ENCOUNTER: Primary | ICD-10-CM

## 2024-10-02 PROCEDURE — 73630 X-RAY EXAM OF FOOT: CPT | Mod: LT

## 2024-10-02 PROCEDURE — 99214 OFFICE O/P EST MOD 30 MIN: CPT | Performed by: ORTHOPAEDIC SURGERY

## 2024-10-02 PROCEDURE — 1160F RVW MEDS BY RX/DR IN RCRD: CPT | Performed by: ORTHOPAEDIC SURGERY

## 2024-10-02 PROCEDURE — L4361 PNEUMA/VAC WALK BOOT PRE OTS: HCPCS | Performed by: ORTHOPAEDIC SURGERY

## 2024-10-02 PROCEDURE — 1036F TOBACCO NON-USER: CPT | Performed by: ORTHOPAEDIC SURGERY

## 2024-10-02 PROCEDURE — 73630 X-RAY EXAM OF FOOT: CPT | Mod: LEFT SIDE | Performed by: RADIOLOGY

## 2024-10-02 PROCEDURE — 1125F AMNT PAIN NOTED PAIN PRSNT: CPT | Performed by: ORTHOPAEDIC SURGERY

## 2024-10-02 PROCEDURE — 1159F MED LIST DOCD IN RCRD: CPT | Performed by: ORTHOPAEDIC SURGERY

## 2024-10-02 ASSESSMENT — PAIN - FUNCTIONAL ASSESSMENT: PAIN_FUNCTIONAL_ASSESSMENT: 0-10

## 2024-10-02 ASSESSMENT — PAIN DESCRIPTION - DESCRIPTORS: DESCRIPTORS: ACHING

## 2024-10-02 ASSESSMENT — PAIN SCALES - GENERAL: PAINLEVEL_OUTOF10: 2

## 2024-10-02 NOTE — PROGRESS NOTES
79-year-old female presenting for initial evaluation of a left fifth metatarsal base fracture.  About 6 weeks ago she tripped on a garden hose and injured her lateral left foot.  She was seen in emergency room at [facility] initially.  She still having some dorsal foot pain on her lateral forefoot.  Her boot is not fitting her well.  She is improving somewhat but still in pain with activity.    The patient does not endorse fevers and chills. The patient does not endorse any change in her vision or hearing. They do not endorse chest pain, shortness of breath. The patient does not endorse any abdominal discomfort. They do not endorse any skin irritation or lesions. They do not endorse any new numbness and tingling or as otherwise stated in the history of present illness.    She is in no acute distress, alert and oriented x 3.    Mood and affect are appropriate.    Respirations are unlabored.    Distal limb is pink and well perfused.    Musculoskeletal evaluation of the left lower extremity demonstrates improved tenderness to palpation of the lateral foot border at the base of the fifth metatarsal.  Ecchymosis is resolved.  Ankle and foot range of motion are well-preserved.  Sensation is intact to light touch in the tibial, sural, saphenous, superficial peroneal, and deep peroneal nerve distributions. Foot is warm and well-perfused.    Multiple views of the left foot demonstrates a minimally displaced base of fifth metatarsal fracture in good alignment.    79-year-old female with a minimally displaced base of the left fifth metatarsal fracture.  She is done very well so far with conservative management just with some residual soreness.  I will get her a cam boot from our office.  Patient was prescribed a cam walker boot for fifth metatarsal fracture.The patient is ambulatory with or without aid; but, has weakness, instability and/or deformity of their left Getting up and lower extremity which requires stabilization  from this orthosis to improve their function.      Verbal and written instructions for the use, wear schedule, cleaning and application of this item were given.  Patient was instructed that should the orthotic device result in increased pain, decreased sensation, increased swelling, or an overall worsening of their medical condition, to please contact our office immediately.     Orthotic management and training was provided for skin care, modifications due to healing tissues, edema changes, interruption in skin integrity, and safety precautions with the orthosis.    I will see back further x-ray about 2 months with 3 views left foot.  She can weight-bear as tolerated in the boot.  We discussed physical therapy as needed if needed but she should be all right with home exercise program.  Continue increasing activity as tolerated.  Vitamin D supplementation is recommended.      Natural History reviewed. All questions answered. The patient was in agreement with the plan.      **This note was created using voice recognition software and was not corrected for typographical or grammatical errors.**

## 2024-12-11 ENCOUNTER — OFFICE VISIT (OUTPATIENT)
Dept: ORTHOPEDIC SURGERY | Facility: CLINIC | Age: 79
End: 2024-12-11
Payer: MEDICARE

## 2024-12-11 ENCOUNTER — HOSPITAL ENCOUNTER (OUTPATIENT)
Dept: RADIOLOGY | Facility: CLINIC | Age: 79
Discharge: HOME | End: 2024-12-11
Payer: MEDICARE

## 2024-12-11 VITALS — WEIGHT: 202 LBS | HEIGHT: 67 IN | BODY MASS INDEX: 31.71 KG/M2

## 2024-12-11 DIAGNOSIS — S92.352A CLOSED DISPLACED FRACTURE OF FIFTH METATARSAL BONE OF LEFT FOOT, INITIAL ENCOUNTER: ICD-10-CM

## 2024-12-11 DIAGNOSIS — S92.352A CLOSED DISPLACED FRACTURE OF FIFTH METATARSAL BONE OF LEFT FOOT, INITIAL ENCOUNTER: Primary | ICD-10-CM

## 2024-12-11 PROCEDURE — 1160F RVW MEDS BY RX/DR IN RCRD: CPT | Performed by: ORTHOPAEDIC SURGERY

## 2024-12-11 PROCEDURE — 1159F MED LIST DOCD IN RCRD: CPT | Performed by: ORTHOPAEDIC SURGERY

## 2024-12-11 PROCEDURE — 73630 X-RAY EXAM OF FOOT: CPT | Mod: LT

## 2024-12-11 PROCEDURE — 73630 X-RAY EXAM OF FOOT: CPT | Mod: LEFT SIDE | Performed by: RADIOLOGY

## 2024-12-11 PROCEDURE — 99214 OFFICE O/P EST MOD 30 MIN: CPT | Performed by: ORTHOPAEDIC SURGERY

## 2024-12-11 ASSESSMENT — PAIN - FUNCTIONAL ASSESSMENT: PAIN_FUNCTIONAL_ASSESSMENT: NO/DENIES PAIN

## 2024-12-15 NOTE — PROGRESS NOTES
79-year-old female presenting for initial evaluation of a left fifth metatarsal base fracture.  She had a T trip and fall about 3 months ago.  She still having pain and symptoms but not in the base of her fifth metatarsal, more across the dorsal aspect of her foot and toes that feels more electric in nature.    The patient does not endorse fevers and chills. The patient does not endorse any change in her vision or hearing. They do not endorse chest pain, shortness of breath. The patient does not endorse any abdominal discomfort. They do not endorse any skin irritation or lesions. They do not endorse any new numbness and tingling or as otherwise stated in the history of present illness.    She is in no acute distress, alert and oriented x 3.    Mood and affect are appropriate.    Respirations are unlabored.    Distal limb is pink and well perfused.    Musculoskeletal evaluation of the left lower extremity demonstrates no tenderness to palpation of the lateral foot border at the base of the fifth metatarsal.  Ecchymosis is resolved.  Ankle and foot range of motion are well-preserved.  Sensation is intact to light touch in the tibial, sural, saphenous, superficial peroneal, and deep peroneal nerve distributions. Foot is warm and well-perfused.  She has pain to palpation across the dorsal and plantar aspects of her lateral forefoot with pain to palpation over the plantar surface positive response to a click maneuver    Multiple views of the left foot demonstrates a minimally displaced base of fifth metatarsal fracture in good alignment.    79-year-old female with a minimally displaced base of the left fifth metatarsal fracture.  X-rays demonstrate her fracture is healed now she is having possibly related symptoms from may be a neuroma in her forefoot.  I will have her see my foot and ankle partner for an evaluation for that and she can see me back for her fracture in 3 months with 3 views    Natural History reviewed.  All questions answered. The patient was in agreement with the plan.      **This note was created using voice recognition software and was not corrected for typographical or grammatical errors.**

## 2025-01-08 ENCOUNTER — APPOINTMENT (OUTPATIENT)
Dept: RADIOLOGY | Facility: CLINIC | Age: 80
End: 2025-01-08
Payer: MEDICARE

## 2025-01-14 ENCOUNTER — HOSPITAL ENCOUNTER (OUTPATIENT)
Dept: CARDIOLOGY | Facility: HOSPITAL | Age: 80
Discharge: HOME | End: 2025-01-14
Payer: MEDICARE

## 2025-01-14 DIAGNOSIS — R07.89 OTHER CHEST PAIN: ICD-10-CM

## 2025-01-14 DIAGNOSIS — R06.00 DYSPNEA, UNSPECIFIED: ICD-10-CM

## 2025-01-14 DIAGNOSIS — R07.9 CHEST PAIN, UNSPECIFIED: ICD-10-CM

## 2025-01-14 LAB — EJECTION FRACTION APICAL 4 CHAMBER: 54.2

## 2025-01-14 PROCEDURE — 93306 TTE W/DOPPLER COMPLETE: CPT | Mod: 59

## 2025-01-14 PROCEDURE — 93350 STRESS TTE ONLY: CPT | Performed by: INTERNAL MEDICINE

## 2025-01-14 PROCEDURE — 93018 CV STRESS TEST I&R ONLY: CPT | Performed by: INTERNAL MEDICINE

## 2025-01-14 PROCEDURE — 93325 DOPPLER ECHO COLOR FLOW MAPG: CPT | Performed by: INTERNAL MEDICINE

## 2025-01-14 PROCEDURE — 93321 DOPPLER ECHO F-UP/LMTD STD: CPT | Performed by: INTERNAL MEDICINE

## 2025-01-14 PROCEDURE — 93016 CV STRESS TEST SUPVJ ONLY: CPT | Performed by: INTERNAL MEDICINE

## 2025-01-14 PROCEDURE — 93321 DOPPLER ECHO F-UP/LMTD STD: CPT | Mod: 59

## 2025-01-15 ENCOUNTER — APPOINTMENT (OUTPATIENT)
Dept: ORTHOPEDIC SURGERY | Facility: CLINIC | Age: 80
End: 2025-01-15
Payer: MEDICARE

## 2025-01-17 LAB
AORTIC VALVE MEAN GRADIENT: 4 MMHG
AORTIC VALVE PEAK VELOCITY: 1.45 M/S
AV PEAK GRADIENT: 8 MMHG
AVA (PEAK VEL): 2.64 CM2
AVA (VTI): 2.57 CM2
EJECTION FRACTION APICAL 4 CHAMBER: 57.4
EJECTION FRACTION: 65 %
LEFT ATRIUM VOLUME AREA LENGTH INDEX BSA: 48.2 ML/M2
LEFT VENTRICLE INTERNAL DIMENSION DIASTOLE: 5.06 CM (ref 3.5–6)
LEFT VENTRICULAR OUTFLOW TRACT DIAMETER: 2.07 CM
MITRAL VALVE E/A RATIO: 0.75
RIGHT VENTRICLE FREE WALL PEAK S': 20 CM/S
RIGHT VENTRICLE PEAK SYSTOLIC PRESSURE: 33.3 MMHG
TRICUSPID ANNULAR PLANE SYSTOLIC EXCURSION: 3.2 CM

## 2025-01-22 ENCOUNTER — APPOINTMENT (OUTPATIENT)
Dept: RADIOLOGY | Facility: CLINIC | Age: 80
End: 2025-01-22
Payer: MEDICARE

## 2025-01-23 ENCOUNTER — OFFICE VISIT (OUTPATIENT)
Dept: ORTHOPEDIC SURGERY | Facility: CLINIC | Age: 80
End: 2025-01-23
Payer: MEDICARE

## 2025-01-23 ENCOUNTER — HOSPITAL ENCOUNTER (OUTPATIENT)
Dept: RADIOLOGY | Facility: CLINIC | Age: 80
Discharge: HOME | End: 2025-01-23
Payer: MEDICARE

## 2025-01-23 DIAGNOSIS — S92.352A CLOSED DISPLACED FRACTURE OF FIFTH METATARSAL BONE OF LEFT FOOT, INITIAL ENCOUNTER: ICD-10-CM

## 2025-01-23 DIAGNOSIS — G57.82 INTERDIGITAL NEUROMA OF LEFT FOOT: ICD-10-CM

## 2025-01-23 DIAGNOSIS — M84.375A STRESS FRACTURE OF LEFT FOOT, INITIAL ENCOUNTER: ICD-10-CM

## 2025-01-23 PROCEDURE — 99213 OFFICE O/P EST LOW 20 MIN: CPT | Performed by: STUDENT IN AN ORGANIZED HEALTH CARE EDUCATION/TRAINING PROGRAM

## 2025-01-23 PROCEDURE — 73610 X-RAY EXAM OF ANKLE: CPT | Mod: LT

## 2025-01-23 PROCEDURE — 73630 X-RAY EXAM OF FOOT: CPT | Mod: LEFT SIDE | Performed by: RADIOLOGY

## 2025-01-23 PROCEDURE — 73630 X-RAY EXAM OF FOOT: CPT | Mod: LT

## 2025-01-23 PROCEDURE — 1125F AMNT PAIN NOTED PAIN PRSNT: CPT | Performed by: STUDENT IN AN ORGANIZED HEALTH CARE EDUCATION/TRAINING PROGRAM

## 2025-01-23 PROCEDURE — 1159F MED LIST DOCD IN RCRD: CPT | Performed by: STUDENT IN AN ORGANIZED HEALTH CARE EDUCATION/TRAINING PROGRAM

## 2025-01-23 PROCEDURE — 73610 X-RAY EXAM OF ANKLE: CPT | Mod: LEFT SIDE | Performed by: RADIOLOGY

## 2025-01-23 ASSESSMENT — PAIN SCALES - GENERAL: PAINLEVEL_OUTOF10: 7

## 2025-01-23 ASSESSMENT — PAIN - FUNCTIONAL ASSESSMENT: PAIN_FUNCTIONAL_ASSESSMENT: 0-10

## 2025-01-23 ASSESSMENT — PAIN DESCRIPTION - DESCRIPTORS: DESCRIPTORS: ACHING;SHOOTING;SHARP;STABBING

## 2025-01-23 NOTE — PROGRESS NOTES
ORTHOPAEDIC SURGERY OUTPATIENT PROGRESS NOTE    Chief Complaint:  Left foot pain    History Of Present Illness  Irasema Jaime is a 79 y.o. female who presents for evaluation of left foot pain as a referral from Dr. Cee.  Patient has had a longstanding history of foot pain dating back to August 2024.  Patient reports that she had a second injury several weeks after the first when she had a fall downstairs and noticed increased bruising at that time.  She unfortunately has continued with 7 out of 10 pain that is relatively unchanged.  Pain is made worse with walking, standing as well as use of her [employer] exercise bike or [employer] reformer.  She does notice it stabbing pain at the plantar surface of her foot.  She has had no prior history of similar pain or injury.  She is not having recent changes in her activity or shoewear.  Patient has not been using any orthotics, braces or inserts in her shoes, she has not had any formal PT HEP or CSI.     Past Medical History  Past Medical History:   Diagnosis Date    Acute pharyngitis, unspecified     Sore throat    Personal history of other diseases of the digestive system     History of esophageal reflux       Surgical History  Recent Surgeries in Orthopaedic Surgery            No cases to display             Social History  Social History     Socioeconomic History    Marital status: Single   Tobacco Use    Smoking status: Never    Smokeless tobacco: Never   Substance and Sexual Activity    Alcohol use: Never    Drug use: Never     Social Drivers of Health     Financial Resource Strain: Low Risk  (8/4/2020)    Received from [facility]    Overall Financial Resource Strain (CARDIA)     Difficulty of Paying Living Expenses: Not hard at all   Food Insecurity: No Food Insecurity (8/4/2020)    Received from [facility]    Hunger Vital Sign     Worried About Running Out of Food in the Last Year: Never true     Ran Out of Food in  the Last Year: Never true   Transportation Needs: No Transportation Needs (8/4/2020)    Received from [facility]    PRAPARE - Transportation     Lack of Transportation (Medical): No     Lack of Transportation (Non-Medical): No   Physical Activity: Insufficiently Active (8/4/2020)    Received from [facility]    Exercise Vital Sign     Days of Exercise per Week: 2 days     Minutes of Exercise per Session: 30 min   Stress: Stress Concern Present (8/4/2020)    Received from [facility]    [employer] of [employer] - Occupational Stress Questionnaire     Feeling of Stress : To some extent   Social Connections: Moderately Isolated (8/4/2020)    Received from [facility]    Social Connection and Isolation Panel [NHANES]     Frequency of Communication with Friends and Family: Once a week     Frequency of Social Gatherings with Friends and Family: Never     Attends Shinto Services: Never     Active Member of Clubs or Organizations: Yes     Attends Club or Organization Meetings: Never     Marital Status: Living with partner   Housing Stability: Unknown (1/5/2021)    Received from [facility]    Housing Stability Vital Sign     Unable to Pay for Housing in the Last Year: No     Unstable Housing in the Last Year: No       Family History  No family history on file.     Allergies  Allergies   Allergen Reactions    Anesthetics - Amide Type - Select Amino Amides Hives     SADI TYPE ANESTHESIA - per patient is allergic to the preservative. Had lidocaine injection for botox without preservative and did fine.    Has HR and BP drop.    Levofloxacin Itching     Is willing to try Ciprofloxacin after seeing allergist.    Metronidazole Hives    Metronidazole Hcl Hives    Phenylephrine Compound Other     Pt can not have the phenylephrine drops 2.5%, gives her horrible headaches.    Protonix [Pantoprazole]  Hives    Sulfamethoxazole-Trimethoprim Swelling    Tegaserod Hives    Latex Rash    Nitrofurantoin Monohyd/M-Cryst Rash       Review of Systems  REVIEW OF SYSTEMS  Constitutional: no unplanned weight loss  Psychiatric: no suicidal ideation  ENT: no vision changes, no sinus problems  Pulmonary: no shortness of breath  Lymphatic: no enlarged lymph nodes  Cardiovascular: no chest pain or shortness of breath  Gastrointestinal: no stomach problems  Genitourinary: no dysuria   Skin: no rashes  Endocrine: no thyroid problems  Neurological: no headache, no numbness  Hematological: no easy bruising  Musculoskeletal:Left foot pain     Physical Exam  PHYSICAL EXAMINATION  Constitutional Exam: well developed and well nourished  Psychiatric Exam: alert and oriented, appropriate mood and behavior  Eye Exam: EOMI  Pulmonary Exam: breathing non-labored, no apparent distress  Lymphatic exam: no appreciable lymphadenopathy in the lower extremities  Cardiovascular exam: RRR to peripheral palpation, DP pulses 2+, PT 2+, toes are pink with good capillary refill, no pitting edema  Skin exam: no open lesions, rashes, abrasions or ulcerations  Neurological exam: sensation to light touch intact in both lower extremities in peripheral and dermatomal distributions (except for any abnormalities noted in musculoskeletal exam)    Musculoskeletal exam: Left lower extremity examination.  Patient pain localized primarily to 2 regions, dorsolaterally about the cuboid as well as plantar distally in the third webspace.  Patient is focally tender to palpation both these regions.  She has a negative Tinel's overlying the superficial peroneal nerve.  Relatively nontender to palpation the base of the fifth metatarsal. Nontender to palpation of the ATFL, CFL and peroneal tendons.  Also nontender to palpation at the lateral process talus, sinus Tarsi and anterior process calcaneus.  Patient has a positive Chaitanya's click with radiation and pain. Patient has  sensation intact to light touch grossly in a saphenous, sural, superficial peroneal, deep peroneal and tibial nerve distribution.  Patient has 5 out of 5 strength in plantarflexion, dorsiflexion and EHL. Patient has 2+ DP/PT pulse palpated.     Last Recorded Vitals  There were no vitals taken for this visit.    Laboratory Results  No results found for this or any previous visit (from the past 24 hours).     Radiology Results  X-ray imaging 3 view weightbearing left foot and ankle reviewed and independently evaluated by me on 01/23/2025 demonstrates no acute fracture or dislocation, there are sclerotic changes about the cuboid perhaps suggestive of prior injury.  Clinical forefoot appears well aligned, obvious splay sign of the lesser toes.  Patient appears to have well-healed base of fifth metatarsal fracture.    Assessment/Plan:  79-year-old female who in my impression has persistent left foot pain clinically most consistent with neuroma and findings concerning for possibility of cuboid stress injury with healing fifth metatarsal base fracture.  I have reviewed the diagnosis and treatment options extensively with the patient.  I would recommend the patient continue weightbearing to her tolerance in her left lower extremity.  I will order an MRI of her left foot to more completely evaluate her pain generators including the possibility of neuroma with consideration for neuroma excision.  I would plan to see the patient back following completion of her imaging for discussion regarding next treatment steps.  Upon return, patient would not require further imaging.      Galo Carrillo MD, STEVE  Department of Orthopaedic Surgery  [facility]    The diagnosis and treatment plan were reviewed with the patient. All questions were answered. The patient verbalized understanding of the treatment plan. There were no barriers to understanding identified.    Note dictated with [employer]  transcription software.  Completed without full type editing and sent to avoid delay.

## 2025-02-06 ENCOUNTER — HOSPITAL ENCOUNTER (OUTPATIENT)
Dept: RADIOLOGY | Facility: CLINIC | Age: 80
Discharge: HOME | End: 2025-02-06
Payer: MEDICARE

## 2025-02-06 VITALS — WEIGHT: 192 LBS | BODY MASS INDEX: 30.07 KG/M2

## 2025-02-06 DIAGNOSIS — Z12.31 ENCOUNTER FOR SCREENING MAMMOGRAM FOR MALIGNANT NEOPLASM OF BREAST: ICD-10-CM

## 2025-02-06 PROCEDURE — 77067 SCR MAMMO BI INCL CAD: CPT

## 2025-02-06 PROCEDURE — 77063 BREAST TOMOSYNTHESIS BI: CPT | Performed by: RADIOLOGY

## 2025-02-06 PROCEDURE — 77067 SCR MAMMO BI INCL CAD: CPT | Performed by: RADIOLOGY

## 2025-02-10 ENCOUNTER — HOSPITAL ENCOUNTER (OUTPATIENT)
Dept: RADIOLOGY | Facility: HOSPITAL | Age: 80
Discharge: HOME | End: 2025-02-10
Payer: MEDICARE

## 2025-02-10 DIAGNOSIS — G57.82 INTERDIGITAL NEUROMA OF LEFT FOOT: ICD-10-CM

## 2025-02-10 DIAGNOSIS — M84.375A STRESS FRACTURE OF LEFT FOOT, INITIAL ENCOUNTER: ICD-10-CM

## 2025-02-10 DIAGNOSIS — S92.352A CLOSED DISPLACED FRACTURE OF FIFTH METATARSAL BONE OF LEFT FOOT, INITIAL ENCOUNTER: ICD-10-CM

## 2025-02-10 PROCEDURE — 73718 MRI LOWER EXTREMITY W/O DYE: CPT | Mod: LT

## 2025-02-10 PROCEDURE — 73718 MRI LOWER EXTREMITY W/O DYE: CPT | Mod: LEFT SIDE | Performed by: STUDENT IN AN ORGANIZED HEALTH CARE EDUCATION/TRAINING PROGRAM

## 2025-02-13 ENCOUNTER — OFFICE VISIT (OUTPATIENT)
Dept: ORTHOPEDIC SURGERY | Facility: CLINIC | Age: 80
End: 2025-02-13
Payer: MEDICARE

## 2025-02-13 DIAGNOSIS — G57.82 INTERDIGITAL NEUROMA OF LEFT FOOT: Primary | ICD-10-CM

## 2025-02-13 PROCEDURE — 99213 OFFICE O/P EST LOW 20 MIN: CPT | Performed by: STUDENT IN AN ORGANIZED HEALTH CARE EDUCATION/TRAINING PROGRAM

## 2025-02-13 PROCEDURE — 1125F AMNT PAIN NOTED PAIN PRSNT: CPT | Performed by: STUDENT IN AN ORGANIZED HEALTH CARE EDUCATION/TRAINING PROGRAM

## 2025-02-13 PROCEDURE — 1159F MED LIST DOCD IN RCRD: CPT | Performed by: STUDENT IN AN ORGANIZED HEALTH CARE EDUCATION/TRAINING PROGRAM

## 2025-02-13 ASSESSMENT — PAIN - FUNCTIONAL ASSESSMENT: PAIN_FUNCTIONAL_ASSESSMENT: 0-10

## 2025-02-13 ASSESSMENT — PAIN SCALES - GENERAL: PAINLEVEL_OUTOF10: 6

## 2025-02-13 ASSESSMENT — PAIN DESCRIPTION - DESCRIPTORS: DESCRIPTORS: ACHING;SORE;SHOOTING

## 2025-02-13 NOTE — PROGRESS NOTES
ORTHOPAEDIC SURGERY OUTPATIENT PROGRESS NOTE    Chief Complaint:  Left foot pain    History Of Present Illness  Irasema Jaime is a 79 y.o. female who presents for evaluation of left foot pain as a referral from Dr. Cee.  Patient has had a longstanding history of foot pain dating back to August 2024.  Patient reports that she had a second injury several weeks after the first when she had a fall downstairs and noticed increased bruising at that time.  She unfortunately has continued with 7 out of 10 pain that is relatively unchanged.  Pain is made worse with walking, standing as well as use of her [employer] exercise bike or [employer] reformer.  She does notice it stabbing pain at the plantar surface of her foot.  She has had no prior history of similar pain or injury.  She is not having recent changes in her activity or shoewear.  Patient has not been using any orthotics, braces or inserts in her shoes, she has not had any formal PT HEP or CSI.    02/13/2025: Patient returns for follow-up of left foot pain.  She is continue with 6 out of 10 pain is relatively unchanged.  She has been experiencing a burning pain, particular when putting her foot down.  She completed her MRI and is here for MRI review.     Past Medical History  Past Medical History:   Diagnosis Date    Acute pharyngitis, unspecified     Sore throat    Personal history of other diseases of the digestive system     History of esophageal reflux       Surgical History  Recent Surgeries in Orthopaedic Surgery            No cases to display             Social History  Social History     Socioeconomic History    Marital status: Single   Tobacco Use    Smoking status: Never    Smokeless tobacco: Never   Substance and Sexual Activity    Alcohol use: Never    Drug use: Never     Social Drivers of Health     Financial Resource Strain: Low Risk  (8/4/2020)    Received from [facility]    Overall Financial Resource Strain ([facility])      Difficulty of Paying Living Expenses: Not hard at all   Food Insecurity: No Food Insecurity (8/4/2020)    Received from [facility]    Hunger Vital Sign     Worried About Running Out of Food in the Last Year: Never true     Ran Out of Food in the Last Year: Never true   Transportation Needs: No Transportation Needs (8/4/2020)    Received from [facility]    PRAPARE - Transportation     Lack of Transportation (Medical): No     Lack of Transportation (Non-Medical): No   Physical Activity: Insufficiently Active (8/4/2020)    Received from [facility]    Exercise Vital Sign     Days of Exercise per Week: 2 days     Minutes of Exercise per Session: 30 min   Stress: Stress Concern Present (8/4/2020)    Received from [facility] of Occupational Health - Occupational Stress Questionnaire     Feeling of Stress : To some extent   Social Connections: Moderately Isolated (8/4/2020)    Received from [facility]    Social Connection and Isolation Panel [NHANES]     Frequency of Communication with Friends and Family: Once a week     Frequency of Social Gatherings with Friends and Family: Never     Attends [employer] Services: Never     Active Member of Clubs or Organizations: Yes     Attends Club or Organization Meetings: Never     Marital Status: Living with partner   Housing Stability: Unknown (1/5/2021)    Received from [facility]    Housing Stability Vital Sign     Unable to Pay for Housing in the Last Year: No     Unstable Housing in the Last Year: No       Family History  Family History   Family history unknown: Yes        Allergies  Allergies   Allergen Reactions    Anesthetics - Amide Type - Select Amino Amides Hives     SADI TYPE ANESTHESIA - per patient is allergic to the preservative. Had lidocaine injection for botox without preservative and did fine.    Has HR  and BP drop.    Levofloxacin Itching     Is willing to try Ciprofloxacin after seeing allergist.    Metronidazole Hives    Metronidazole Hcl Hives    Phenylephrine Compound Other     Pt can not have the phenylephrine drops 2.5%, gives her horrible headaches.    Protonix [Pantoprazole] Hives    Sulfamethoxazole-Trimethoprim Swelling    Tegaserod Hives    Latex Rash    Nitrofurantoin Monohyd/M-Cryst Rash       Review of Systems  REVIEW OF SYSTEMS  Constitutional: no unplanned weight loss  Psychiatric: no suicidal ideation  ENT: no vision changes, no sinus problems  Pulmonary: no shortness of breath  Lymphatic: no enlarged lymph nodes  Cardiovascular: no chest pain or shortness of breath  Gastrointestinal: no stomach problems  Genitourinary: no dysuria   Skin: no rashes  Endocrine: no thyroid problems  Neurological: no headache, no numbness  Hematological: no easy bruising  Musculoskeletal:Left foot pain     Physical Exam  PHYSICAL EXAMINATION  Constitutional Exam: well developed and well nourished  Psychiatric Exam: alert and oriented, appropriate mood and behavior  Eye Exam: EOMI  Pulmonary Exam: breathing non-labored, no apparent distress  Lymphatic exam: no appreciable lymphadenopathy in the lower extremities  Cardiovascular exam: RRR to peripheral palpation, DP pulses 2+, PT 2+, toes are pink with good capillary refill, no pitting edema  Skin exam: no open lesions, rashes, abrasions or ulcerations  Neurological exam: sensation to light touch intact in both lower extremities in peripheral and dermatomal distributions (except for any abnormalities noted in musculoskeletal exam)    Musculoskeletal exam: Left lower extremity examination.  Patient with continued pain plantarly in the third webspace with a positive Chaitanya's click.  Also with previous pain localized to the calcaneus, continues to be mildly tender to palpation there. Patient has sensation intact to light touch grossly in a saphenous, sural, superficial  peroneal, deep peroneal and tibial nerve distribution.  Patient has 5 out of 5 strength in plantarflexion, dorsiflexion and EHL. Patient has 2+ DP/PT pulse palpated.     Last Recorded Vitals  not currently breastfeeding.    Laboratory Results  No results found for this or any previous visit (from the past 24 hours).     Radiology Results  MRI left foot without contrast reviewed from 2/10/2025, independently evaluated by me on 02/13/2025 demonstrates  degenerative midfoot changes with no obvious intermetatarsal bursitis or space-occupying lesion of the plantar foot.    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  I have reviewed the diagnosis and treatment options extensively with the patient.  I would recommend the patient continue weightbearing to her tolerance in her left lower extremity.  I will provide her with information regarding a metatarsal pad for offloading of her plantar foot. I would plan to see the patient back in 2 months to follow her clinical course.  If the patient is not clinically improving consideration could be made for webspace neuroma injection as well as neurectomy.  Upon return patient would not require further imaging.    Galo Carrillo MD, STEVE  Department of Orthopaedic Surgery  [facility]    The diagnosis and treatment plan were reviewed with the patient. All questions were answered. The patient verbalized understanding of the treatment plan. There were no barriers to understanding identified.    Note dictated with [employer] software.  Completed without full type editing and sent to avoid delay.

## 2025-03-12 ENCOUNTER — APPOINTMENT (OUTPATIENT)
Dept: ORTHOPEDIC SURGERY | Facility: CLINIC | Age: 80
End: 2025-03-12
Payer: MEDICARE

## 2025-04-08 ENCOUNTER — OFFICE VISIT (OUTPATIENT)
Dept: ORTHOPEDIC SURGERY | Facility: CLINIC | Age: 80
End: 2025-04-08
Payer: MEDICARE

## 2025-04-08 DIAGNOSIS — G57.82 INTERDIGITAL NEUROMA OF LEFT FOOT: Primary | ICD-10-CM

## 2025-04-08 PROCEDURE — 99212 OFFICE O/P EST SF 10 MIN: CPT | Performed by: STUDENT IN AN ORGANIZED HEALTH CARE EDUCATION/TRAINING PROGRAM

## 2025-04-08 NOTE — PROGRESS NOTES
ORTHOPAEDIC SURGERY OUTPATIENT PROGRESS NOTE    Chief Complaint:  Left foot pain    History Of Present Illness  Irasema Jaime is a 80 y.o. female who presents for evaluation of left foot pain as a referral from Dr. Cee.  Patient has had a longstanding history of foot pain dating back to August 2024.  Patient reports that she had a second injury several weeks after the first when she had a fall downstairs and noticed increased bruising at that time.  She unfortunately has continued with 7 out of 10 pain that is relatively unchanged.  Pain is made worse with walking, standing as well as use of her [employer] exercise bike or [employer] reformer.  She does notice it stabbing pain at the plantar surface of her foot.  She has had no prior history of similar pain or injury.  She is not having recent changes in her activity or shoewear.  Patient has not been using any orthotics, braces or inserts in her shoes, she has not had any formal PT HEP or CSI.    02/13/2025: Patient returns for follow-up of left foot pain.  She is continue with 6 out of 10 pain is relatively unchanged.  She has been experiencing a burning pain, particular when putting her foot down.  She completed her MRI and is here for MRI review.    04/08/2025: Patient returns for follow-up of left foot pain as above.  She is currently reporting almost no pain.  She has trialed a bunion pad, in fact numerous types that appeared to have minimal effect.  She has tried also some toe mobility which is actually significantly improved her symptoms overall.     Past Medical History  Past Medical History:   Diagnosis Date    Acute pharyngitis, unspecified     Sore throat    Personal history of other diseases of the digestive system     History of esophageal reflux       Surgical History  Recent Surgeries in Orthopaedic Surgery            No cases to display             Social History  Social History     Socioeconomic History    Marital status: Single   Tobacco Use     Smoking status: Never    Smokeless tobacco: Never   Substance and Sexual Activity    Alcohol use: Never    Drug use: Never     Social Drivers of Health     Financial Resource Strain: Low Risk  (8/4/2020)    Received from [facility] [place]    Overall Financial Resource Strain (CARDIA)     Difficulty of Paying Living Expenses: Not hard at all   Food Insecurity: No Food Insecurity (8/4/2020)    Received from [facility] [place]    Hunger Vital Sign     Worried About Running Out of Food in the Last Year: Never true     Ran Out of Food in the Last Year: Never true   Transportation Needs: No Transportation Needs (8/4/2020)    Received from [facility] [place]    PRAPARE - Transportation     Lack of Transportation (Medical): No     Lack of Transportation (Non-Medical): No   Physical Activity: Insufficiently Active (8/4/2020)    Received from [facility] [place]    Exercise Vital Sign     Days of Exercise per Week: 2 days     Minutes of Exercise per Session: 30 min   Stress: Stress Concern Present (8/4/2020)    Received from [facility] - Occupational Stress Questionnaire     Feeling of Stress : To some extent   Social Connections: Moderately Isolated (8/4/2020)    Received from [facility] [place]    Social Connection and Isolation Panel [NHANES]     Frequency of Communication with Friends and Family: Once a week     Frequency of Social Gatherings with Friends and Family: Never     Attends Cheondoism Services: Never     Active Member of Clubs or Organizations: Yes     Attends Club or Organization Meetings: Never     Marital Status: Living with partner   Housing Stability: Unknown (1/5/2021)    Received from [facility] [place]    Housing Stability Vital Sign     Unable to Pay for Housing in the Last Year: No     Unstable Housing in the Last Year: No       Family  History  Family History   Family history unknown: Yes        Allergies  Allergies   Allergen Reactions    Anesthetics - Amide Type - Select Amino Amides Hives     SADI TYPE ANESTHESIA - per patient is allergic to the preservative. Had lidocaine injection for botox without preservative and did fine.    Has HR and BP drop.    Levofloxacin Itching     Is willing to try Ciprofloxacin after seeing allergist.    Metronidazole Hives    Metronidazole Hcl Hives    Phenylephrine Compound Other     Pt can not have the phenylephrine drops 2.5%, gives her horrible headaches.    Protonix [Pantoprazole] Hives    Sulfamethoxazole-Trimethoprim Swelling    Tegaserod Hives    Latex Rash    Nitrofurantoin Monohyd/M-Cryst Rash       Review of Systems  REVIEW OF SYSTEMS  Constitutional: no unplanned weight loss  Psychiatric: no suicidal ideation  ENT: no vision changes, no sinus problems  Pulmonary: no shortness of breath  Lymphatic: no enlarged lymph nodes  Cardiovascular: no chest pain or shortness of breath  Gastrointestinal: no stomach problems  Genitourinary: no dysuria   Skin: no rashes  Endocrine: no thyroid problems  Neurological: no headache, no numbness  Hematological: no easy bruising  Musculoskeletal:Left foot pain     Physical Exam  PHYSICAL EXAMINATION  Constitutional Exam: well developed and well nourished  Psychiatric Exam: alert and oriented, appropriate mood and behavior  Eye Exam: EOMI  Pulmonary Exam: breathing non-labored, no apparent distress  Lymphatic exam: no appreciable lymphadenopathy in the lower extremities  Cardiovascular exam: RRR to peripheral palpation, DP pulses 2+, PT 2+, toes are pink with good capillary refill, no pitting edema  Skin exam: no open lesions, rashes, abrasions or ulcerations  Neurological exam: sensation to light touch intact in both lower extremities in peripheral and dermatomal distributions (except for any abnormalities noted in musculoskeletal exam)    Musculoskeletal exam: Left  lower extremity examination.  Patient with continues pain plantarly in the third webspace with a positive Chaitanya's click, click not painful on examination today. Patient has sensation intact to light touch grossly in a saphenous, sural, superficial peroneal, deep peroneal and tibial nerve distribution.  Patient has 5 out of 5 strength in plantarflexion, dorsiflexion and EHL. Patient has 2+ DP/PT pulse palpated.     Last Recorded Vitals  not currently breastfeeding.    Laboratory Results  No results found for this or any previous visit (from the past 24 hours).     Radiology Results  Previously reviewed imaging:  MRI left foot without contrast reviewed from 2/10/2025, independently evaluated by me on 02/13/2025 demonstrates  degenerative midfoot changes with no obvious intermetatarsal bursitis or space-occupying lesion of the plantar foot.    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  I have reviewed the diagnosis and treatment options extensively with the patient.  I would recommend the patient continue weightbearing to her tolerance in her left lower extremity.  I have no restrictions for her.  She may continue with sturdy, supportive and accommodative footwear.  She may also continue to attempt offloading with metatarsal pad.  I would plan to see the patient back on an as-needed basis.  I have encouraged patient contact the office if he develops any new pain, worsening symptoms she has any further questions, at that time consideration could be made for webspace neuroma injection as well as neurectomy.  Upon return patient would not require further imaging.    Galo Carrillo MD, STEVE  Department of Orthopaedic Surgery  [facility]    The diagnosis and treatment plan were reviewed with the patient. All questions were answered. The patient verbalized understanding of the  treatment plan. There were no barriers to understanding identified.    Note dictated with [employer] software.  Completed without full type editing and sent to avoid delay.

## 2025-05-16 ENCOUNTER — APPOINTMENT (OUTPATIENT)
Dept: UROLOGY | Facility: CLINIC | Age: 80
End: 2025-05-16
Payer: MEDICARE

## 2025-05-16 VITALS — BODY MASS INDEX: 30.45 KG/M2 | WEIGHT: 194 LBS | TEMPERATURE: 97.6 F | HEIGHT: 67 IN

## 2025-05-16 DIAGNOSIS — N39.0 RECURRENT UTI: Primary | ICD-10-CM

## 2025-05-16 DIAGNOSIS — N39.0 URINARY TRACT INFECTION WITHOUT HEMATURIA, SITE UNSPECIFIED: ICD-10-CM

## 2025-05-16 LAB
POC APPEARANCE, URINE: CLEAR
POC BILIRUBIN, URINE: NEGATIVE
POC BLOOD, URINE: NEGATIVE
POC COLOR, URINE: YELLOW
POC GLUCOSE, URINE: NEGATIVE MG/DL
POC KETONES, URINE: NEGATIVE MG/DL
POC LEUKOCYTES, URINE: NEGATIVE
POC NITRITE,URINE: NEGATIVE
POC PH, URINE: 6 PH
POC PROTEIN, URINE: NEGATIVE MG/DL
POC SPECIFIC GRAVITY, URINE: 1.01
POC UROBILINOGEN, URINE: 0.2 EU/DL

## 2025-05-16 PROCEDURE — 1036F TOBACCO NON-USER: CPT | Performed by: STUDENT IN AN ORGANIZED HEALTH CARE EDUCATION/TRAINING PROGRAM

## 2025-05-16 PROCEDURE — 1159F MED LIST DOCD IN RCRD: CPT | Performed by: STUDENT IN AN ORGANIZED HEALTH CARE EDUCATION/TRAINING PROGRAM

## 2025-05-16 PROCEDURE — 99204 OFFICE O/P NEW MOD 45 MIN: CPT | Performed by: STUDENT IN AN ORGANIZED HEALTH CARE EDUCATION/TRAINING PROGRAM

## 2025-05-16 PROCEDURE — 81003 URINALYSIS AUTO W/O SCOPE: CPT | Performed by: STUDENT IN AN ORGANIZED HEALTH CARE EDUCATION/TRAINING PROGRAM

## 2025-05-16 PROCEDURE — G2211 COMPLEX E/M VISIT ADD ON: HCPCS | Performed by: STUDENT IN AN ORGANIZED HEALTH CARE EDUCATION/TRAINING PROGRAM

## 2025-05-16 PROCEDURE — 1126F AMNT PAIN NOTED NONE PRSNT: CPT | Performed by: STUDENT IN AN ORGANIZED HEALTH CARE EDUCATION/TRAINING PROGRAM

## 2025-05-16 RX ORDER — SULFAMETHOXAZOLE AND TRIMETHOPRIM 400; 80 MG/1; MG/1
0.5 TABLET ORAL 3 TIMES WEEKLY
Qty: 18 TABLET | Refills: 0 | Status: SHIPPED | OUTPATIENT
Start: 2025-05-16 | End: 2025-08-14

## 2025-05-16 RX ORDER — ESTRADIOL 0.1 MG/G
CREAM VAGINAL
Qty: 42.5 G | Refills: 11 | Status: SHIPPED | OUTPATIENT
Start: 2025-05-16 | End: 2026-05-16

## 2025-05-16 ASSESSMENT — ENCOUNTER SYMPTOMS: FEVER: 0

## 2025-05-16 ASSESSMENT — PAIN SCALES - GENERAL: PAINLEVEL_OUTOF10: 0-NO PAIN

## 2025-05-16 NOTE — PROGRESS NOTES
"History Of Present Illness  Irasema Jaime is a 80 y.o. female presenting with hx recurrent UTI (positive cultures Sept 2023, Oct 2023, April 2024) in the context of postmenopausal status. She normally gets tested at [facility] or [facility] doctor who is doing urine dip. Current prophylaxis: vaginal estrogen. Just finished Bactrim. Not sexually active since August.    No recent cultures for reference. Urine dip today negative  Allergies: latex, levofloxacin, nitrofurantoin     Renal function: normal Nov 2024  A1C: glucose wnl 2024  Renal stone hx: no  PVR today: 100  Hx breast lumpectomy for benign disease     Past Medical History  She has a past medical history of Acute pharyngitis, unspecified, Asthma, IBS (irritable bowel syndrome), Migraine with aura, and Personal history of other diseases of the digestive system.    She has no past medical history of Personal history of irradiation.    Surgical History  She has a past surgical history that includes Cholecystectomy (08/25/2015); Breast cyst aspiration (Many); Breast lumpectomy (2010.  ?); Breast biopsy (Many); and Breast cyst excision (i dont remember).     Social History  She reports that she has never smoked. She has never used smokeless tobacco. She reports that she does not drink alcohol and does not use drugs.    Family History  Family History[1]     Allergies  Levofloxacin, Phenylephrine compound, Protonix [pantoprazole], Tegaserod, Latex, and Nitrofurantoin monohyd/m-cryst    Review of Systems   Constitutional:  Negative for fever.   All other systems reviewed and are negative.       Physical Exam  Con: awake, alert, NAD  HEENT: normocephalic, speech normal  CV: no peripheral edema  Resp: no increased work of breathing  Neuro: normal mentation  Psych: mood normal  Skin: no rash         Last Recorded Vitals  Temperature 36.4 °C (97.6 °F), height 1.702 m (5' 7\"), weight 88 kg (194 lb), not currently breastfeeding.    Relevant Results    Results for " orders placed or performed in visit on 05/16/25 (from the past 24 hours)   POCT UA Automated manually resulted   Result Value Ref Range    POC Color, Urine Yellow Straw, Yellow, Light-Yellow    POC Appearance, Urine Clear Clear    POC Glucose, Urine NEGATIVE NEGATIVE mg/dl    POC Bilirubin, Urine NEGATIVE NEGATIVE    POC Ketones, Urine NEGATIVE NEGATIVE mg/dl    POC Specific Gravity, Urine 1.015 1.005 - 1.035    POC Blood, Urine NEGATIVE NEGATIVE    POC PH, Urine 6.0 No Reference Range Established PH    POC Protein, Urine NEGATIVE NEGATIVE mg/dl    POC Urobilinogen, Urine 0.2 0.2, 1.0 EU/DL    Poc Nitrite, Urine NEGATIVE NEGATIVE    POC Leukocytes, Urine NEGATIVE NEGATIVE        Assessment/Plan     UTI prophylaxis:  Refilled Vaginal estrogen  Not able to tolerate Hiprex due to GI distress  3 months of prophylactic Bactrim  Standing urine cultures    Follow up in 3 months    Emilee Chu MD, Gynecologist  Female Reconstruction [facility] Sexual Medicine Fellow  Dept of Urology/OBGYN  5/16/2025         [1]   Family History  Problem Relation Name Age of Onset    Dementia Mother      Kidney cancer Father      Melanoma Father      Alzheimer's disease Father

## 2025-05-23 DIAGNOSIS — N39.0 RECURRENT UTI: ICD-10-CM

## 2025-05-27 ENCOUNTER — SPECIALTY PHARMACY (OUTPATIENT)
Dept: PHARMACY | Facility: CLINIC | Age: 80
End: 2025-05-27

## 2025-05-27 ENCOUNTER — OFFICE VISIT (OUTPATIENT)
Dept: GASTROENTEROLOGY | Facility: CLINIC | Age: 80
End: 2025-05-27
Payer: MEDICARE

## 2025-05-27 VITALS
HEIGHT: 67 IN | TEMPERATURE: 97.9 F | WEIGHT: 201 LBS | SYSTOLIC BLOOD PRESSURE: 122 MMHG | DIASTOLIC BLOOD PRESSURE: 70 MMHG | HEART RATE: 65 BPM | BODY MASS INDEX: 31.55 KG/M2

## 2025-05-27 DIAGNOSIS — K58.0 IRRITABLE BOWEL SYNDROME WITH DIARRHEA: Primary | ICD-10-CM

## 2025-05-27 DIAGNOSIS — N39.0 RECURRENT UTI: ICD-10-CM

## 2025-05-27 PROCEDURE — 99214 OFFICE O/P EST MOD 30 MIN: CPT | Performed by: NURSE PRACTITIONER

## 2025-05-27 PROCEDURE — 1160F RVW MEDS BY RX/DR IN RCRD: CPT | Performed by: NURSE PRACTITIONER

## 2025-05-27 PROCEDURE — 99204 OFFICE O/P NEW MOD 45 MIN: CPT | Performed by: NURSE PRACTITIONER

## 2025-05-27 PROCEDURE — 1159F MED LIST DOCD IN RCRD: CPT | Performed by: NURSE PRACTITIONER

## 2025-05-27 ASSESSMENT — ENCOUNTER SYMPTOMS
COUGH: 0
FLANK PAIN: 0
WHEEZING: 0
NUMBNESS: 0
HEADACHES: 0
FATIGUE: 0
EYE PAIN: 0
PHOTOPHOBIA: 0
MYALGIAS: 0
AGITATION: 0
SORE THROAT: 0
JOINT SWELLING: 0
ARTHRALGIAS: 0
PALPITATIONS: 0
FEVER: 0
NERVOUS/ANXIOUS: 0
FREQUENCY: 0
BACK PAIN: 0
HALLUCINATIONS: 0
ADENOPATHY: 0
CHILLS: 0
DYSURIA: 0
SHORTNESS OF BREATH: 0
DIAPHORESIS: 0
HEMATURIA: 0
WEAKNESS: 0
LIGHT-HEADEDNESS: 0
DIZZINESS: 0

## 2025-05-27 NOTE — PATIENT INSTRUCTIONS
Thanks for coming to the GI clinic.     Start Xifaxan 550 mg three times a day for 14 days.     Follow up in 3-4 months.

## 2025-05-27 NOTE — PROGRESS NOTES
Subjective   Patient ID: Irasema Jaime is a 80 y.o. female who presents for digestive issues.     This is a 80-year-old WF with history of anxiety, GERD, IBS, SIBO, EPI, diverticulitis, recurrent UTI, hyperlipidemia, and migraine headaches who is presenting to the GI clinic for initial visit.      History per patient and review of EMR    Patient history somewhat vague and difficult to gather    Of note, patient was following with [facility] in the past.  She also saw Dr. Kimball and Dr. Farmer with  GI in the past.     Reports many years' worth of constipation and diarrhea associated with abdominal cramping/bloating.     Reports chronic diarrhea at age 22. Reports catching an intestinal parasite many years ago.  She also reports having shellfish poisoning many years ago.     More recently, though, she's dealt with chronic left-sided abdominal pain with associated diarrhea (she had relief when she took Xifaxan in the past).  She is asking about trying Xifaxan again.  Sauerkraut will give her diarrhea.    She has diarrhea which will wake her from sleep on rare occasion.     She's tried  several different types of antibiotics for UTI prophylaxis which have resulted in diarrhea including Bactrim.  She is asking me about what antibiotics are good for UTI prophylaxis.  Her diarrhea is not always related to antibiotic use.     She is taking a Culturelle probiotic.    Reports having negative testing for celiac disease in the past.     Reports having SIBO in the past 3 times.     She has been working on weight loss by exercising.    Denies unintentional weight loss, vomiting, hematemesis, hematochezia, and melena.       EGD 3/27/2023 Dr. Kimball  for follow-up of reflux esophagitis: Patulous LES, otherwise normal     Screening colonoscopy 3/27/2023 Dr. Kimball  : Diverticulosis in the sigmoid colon and in the descending colon.     Past medical history:   See above     Past surgical history:    Open cholecystectomy (1983)   D&C for miscarriage   Cataract surgery on both eyes   Surgery for hemorrhoids and anal fissure   Incisional hernia repair   Benign right breast lumpectomy     Family history:   No GI cancers   Granddaughter x2- celiac disease   Father- kidney cancer, melanoma     Social history:   Quit smoking cigarettes at age 57  Denies use of alcohol and illicit drugs     RX Allergies[1]     Current Medications[2]     Review of Systems   Constitutional:  Negative for chills, diaphoresis, fatigue and fever.   HENT:  Negative for congestion, ear pain, hearing loss, sneezing and sore throat.    Eyes:  Negative for photophobia, pain and visual disturbance.   Respiratory:  Negative for cough, shortness of breath and wheezing.    Cardiovascular:  Negative for chest pain, palpitations and leg swelling.   Endocrine: Negative for cold intolerance and heat intolerance.   Genitourinary:  Negative for dysuria, flank pain, frequency and hematuria.   Musculoskeletal:  Negative for arthralgias, back pain, gait problem, joint swelling and myalgias.   Skin:  Negative for rash.   Neurological:  Negative for dizziness, syncope, weakness, light-headedness, numbness and headaches.   Hematological:  Negative for adenopathy.   Psychiatric/Behavioral:  Negative for agitation and hallucinations. The patient is not nervous/anxious.        Objective     Lab Results   Component Value Date    WBC 9.8 02/23/2024    HGB 12.9 02/23/2024    HCT 37.9 02/23/2024    MCV 91 02/23/2024     02/23/2024      Lab Results   Component Value Date    CREATININE 0.89 02/23/2024    BUN 15 02/23/2024     (L) 02/23/2024    K 3.9 02/23/2024     02/23/2024    CO2 26 02/23/2024      Lab Results   Component Value Date    ALT 9 02/23/2024    AST 13 02/23/2024    ALKPHOS 55 02/23/2024    BILITOT 0.5 02/23/2024      Lab Results   Component Value Date    TSH 1.60 04/22/2021        /70   Pulse 65   Temp 36.6 °C (97.9 °F)   Ht  "1.702 m (5' 7\")   Wt 91.2 kg (201 lb)   LMP  (LMP Unknown)   BMI 31.48 kg/m²     Physical Exam  Constitutional:       General: She is not in acute distress.     Appearance: Normal appearance.   HENT:      Head: Normocephalic and atraumatic.   Eyes:      Conjunctiva/sclera: Conjunctivae normal.   Cardiovascular:      Rate and Rhythm: Normal rate and regular rhythm.      Heart sounds: No murmur heard.     No gallop.   Pulmonary:      Effort: Pulmonary effort is normal.      Breath sounds: Normal breath sounds.   Abdominal:      General: Bowel sounds are normal. There is no distension.      Tenderness: There is no abdominal tenderness. There is no guarding.   Musculoskeletal:         General: No swelling or deformity. Normal range of motion.      Cervical back: Normal range of motion. No rigidity.   Skin:     General: Skin is warm and dry.      Coloration: Skin is not jaundiced.      Findings: No lesion or rash.   Neurological:      General: No focal deficit present.      Mental Status: She is alert and oriented to person, place, and time.   Psychiatric:         Mood and Affect: Mood normal.         Speech: Speech is rapid and pressured.         Assessment/Plan   Problem List Items Addressed This Visit    None  Visit Diagnoses         Irritable bowel syndrome with diarrhea    -  Primary    Relevant Medications    rifAXIMin (Xifaxan) 550 mg tablet      Recurrent UTI               1.  IBS-D:  - Start Xifaxan 550 mg 3 times daily for 14 days    2.  Recurrent UTI: Unfortunately it sounds as if she has had GI intolerance related to several antibiotics.  - Recommend patient discuss potential treatment options options with her urologist    3.  Follow-up:  - Return to clinic in 3 to 4 months         [1]   Allergies  Allergen Reactions    Levofloxacin Itching     Is willing to try Ciprofloxacin after seeing allergist.    Phenylephrine Compound Other     Pt can not have the phenylephrine drops 2.5%, gives her horrible " headaches.    Protonix [Pantoprazole] Hives    Tegaserod Hives    Latex Rash    Nitrofurantoin Monohyd/M-Cryst Rash   [2]   Current Outpatient Medications   Medication Sig Dispense Refill    cholecalciferol, vitamin D3, 50 mcg (2,000 unit) tablet,chewable Chew.      EPINEPHrine 0.3 mg/0.3 mL injection syringe Inject 0.3 mL (0.3 mg) into the muscle if needed for anaphylaxis. Inject into upper leg. Call 911 after use. 1 each 0    estradiol (Estrace) 0.01 % (0.1 mg/gram) vaginal cream Apply 0.5g (blueberry size amount) to vaginal opening nightly 2 - 3 times per week 42.5 g 11    sulfamethoxazole-trimethoprim (Bactrim) 400-80 mg tablet Take 0.5 tablets by mouth 3 (three) times a week. 18 tablet 0    UNABLE TO FIND Culturelle probiotic      rifAXIMin (Xifaxan) 550 mg tablet Take 1 tablet (550 mg) by mouth 3 times a day for 14 days. 42 tablet 0     No current facility-administered medications for this visit.

## 2025-05-30 DIAGNOSIS — N39.0 RECURRENT UTI: ICD-10-CM

## 2025-06-02 ENCOUNTER — HOSPITAL ENCOUNTER (EMERGENCY)
Facility: HOSPITAL | Age: 80
Discharge: HOME | End: 2025-06-02
Attending: EMERGENCY MEDICINE
Payer: MEDICARE

## 2025-06-02 ENCOUNTER — APPOINTMENT (OUTPATIENT)
Dept: RADIOLOGY | Facility: HOSPITAL | Age: 80
End: 2025-06-02
Payer: MEDICARE

## 2025-06-02 VITALS
HEART RATE: 60 BPM | BODY MASS INDEX: 30.61 KG/M2 | DIASTOLIC BLOOD PRESSURE: 70 MMHG | WEIGHT: 195 LBS | HEIGHT: 67 IN | RESPIRATION RATE: 18 BRPM | TEMPERATURE: 98.1 F | SYSTOLIC BLOOD PRESSURE: 112 MMHG | OXYGEN SATURATION: 98 %

## 2025-06-02 DIAGNOSIS — R11.2 NAUSEA AND VOMITING, UNSPECIFIED VOMITING TYPE: Primary | ICD-10-CM

## 2025-06-02 DIAGNOSIS — R51.9 ACUTE NONINTRACTABLE HEADACHE, UNSPECIFIED HEADACHE TYPE: ICD-10-CM

## 2025-06-02 DIAGNOSIS — R05.1 ACUTE COUGH: ICD-10-CM

## 2025-06-02 DIAGNOSIS — J02.9 VIRAL PHARYNGITIS: ICD-10-CM

## 2025-06-02 LAB
ALBUMIN SERPL BCP-MCNC: 4.6 G/DL (ref 3.4–5)
ALP SERPL-CCNC: 79 U/L (ref 33–136)
ALT SERPL W P-5'-P-CCNC: 11 U/L (ref 7–45)
ANION GAP SERPL CALC-SCNC: 17 MMOL/L (ref 10–20)
APPEARANCE UR: CLEAR
AST SERPL W P-5'-P-CCNC: 15 U/L (ref 9–39)
BASOPHILS # BLD AUTO: 0.05 X10*3/UL (ref 0–0.1)
BASOPHILS NFR BLD AUTO: 0.6 %
BILIRUB SERPL-MCNC: 0.6 MG/DL (ref 0–1.2)
BILIRUB UR STRIP.AUTO-MCNC: NEGATIVE MG/DL
BUN SERPL-MCNC: 10 MG/DL (ref 6–23)
CALCIUM SERPL-MCNC: 9.6 MG/DL (ref 8.6–10.3)
CHLORIDE SERPL-SCNC: 96 MMOL/L (ref 98–107)
CO2 SERPL-SCNC: 21 MMOL/L (ref 21–32)
COLOR UR: ABNORMAL
CREAT SERPL-MCNC: 0.65 MG/DL (ref 0.5–1.05)
EGFRCR SERPLBLD CKD-EPI 2021: 90 ML/MIN/1.73M*2
EOSINOPHIL # BLD AUTO: 0.15 X10*3/UL (ref 0–0.4)
EOSINOPHIL NFR BLD AUTO: 1.7 %
ERYTHROCYTE [DISTWIDTH] IN BLOOD BY AUTOMATED COUNT: 13.2 % (ref 11.5–14.5)
FLUAV RNA RESP QL NAA+PROBE: NOT DETECTED
FLUBV RNA RESP QL NAA+PROBE: NOT DETECTED
GLUCOSE SERPL-MCNC: 112 MG/DL (ref 74–99)
GLUCOSE UR STRIP.AUTO-MCNC: NORMAL MG/DL
HCT VFR BLD AUTO: 37 % (ref 36–46)
HGB BLD-MCNC: 12.5 G/DL (ref 12–16)
IMM GRANULOCYTES # BLD AUTO: 0.03 X10*3/UL (ref 0–0.5)
IMM GRANULOCYTES NFR BLD AUTO: 0.3 % (ref 0–0.9)
KETONES UR STRIP.AUTO-MCNC: ABNORMAL MG/DL
LEUKOCYTE ESTERASE UR QL STRIP.AUTO: ABNORMAL
LYMPHOCYTES # BLD AUTO: 1.17 X10*3/UL (ref 0.8–3)
LYMPHOCYTES NFR BLD AUTO: 13 %
MAGNESIUM SERPL-MCNC: 1.84 MG/DL (ref 1.6–2.4)
MCH RBC QN AUTO: 30.1 PG (ref 26–34)
MCHC RBC AUTO-ENTMCNC: 33.8 G/DL (ref 32–36)
MCV RBC AUTO: 89 FL (ref 80–100)
MONOCYTES # BLD AUTO: 0.9 X10*3/UL (ref 0.05–0.8)
MONOCYTES NFR BLD AUTO: 10 %
NEUTROPHILS # BLD AUTO: 6.68 X10*3/UL (ref 1.6–5.5)
NEUTROPHILS NFR BLD AUTO: 74.4 %
NITRITE UR QL STRIP.AUTO: NEGATIVE
NRBC BLD-RTO: 0 /100 WBCS (ref 0–0)
PH UR STRIP.AUTO: 6.5 [PH]
PLATELET # BLD AUTO: 276 X10*3/UL (ref 150–450)
POTASSIUM SERPL-SCNC: 4 MMOL/L (ref 3.5–5.3)
PROT SERPL-MCNC: 7.2 G/DL (ref 6.4–8.2)
PROT UR STRIP.AUTO-MCNC: ABNORMAL MG/DL
RBC # BLD AUTO: 4.15 X10*6/UL (ref 4–5.2)
RBC # UR STRIP.AUTO: NEGATIVE MG/DL
RBC #/AREA URNS AUTO: NORMAL /HPF
RSV RNA RESP QL NAA+PROBE: NOT DETECTED
SODIUM SERPL-SCNC: 130 MMOL/L (ref 136–145)
SP GR UR STRIP.AUTO: 1.01
SQUAMOUS #/AREA URNS AUTO: NORMAL /HPF
UROBILINOGEN UR STRIP.AUTO-MCNC: NORMAL MG/DL
WBC # BLD AUTO: 9 X10*3/UL (ref 4.4–11.3)
WBC #/AREA URNS AUTO: NORMAL /HPF

## 2025-06-02 PROCEDURE — 96374 THER/PROPH/DIAG INJ IV PUSH: CPT

## 2025-06-02 PROCEDURE — 71046 X-RAY EXAM CHEST 2 VIEWS: CPT | Mod: FOREIGN READ | Performed by: RADIOLOGY

## 2025-06-02 PROCEDURE — 87081 CULTURE SCREEN ONLY: CPT | Mod: 59,AHULAB | Performed by: EMERGENCY MEDICINE

## 2025-06-02 PROCEDURE — 83735 ASSAY OF MAGNESIUM: CPT | Performed by: PHYSICIAN ASSISTANT

## 2025-06-02 PROCEDURE — 96375 TX/PRO/DX INJ NEW DRUG ADDON: CPT

## 2025-06-02 PROCEDURE — 36415 COLL VENOUS BLD VENIPUNCTURE: CPT | Performed by: PHYSICIAN ASSISTANT

## 2025-06-02 PROCEDURE — 99284 EMERGENCY DEPT VISIT MOD MDM: CPT | Mod: 25 | Performed by: EMERGENCY MEDICINE

## 2025-06-02 PROCEDURE — 80053 COMPREHEN METABOLIC PANEL: CPT | Performed by: PHYSICIAN ASSISTANT

## 2025-06-02 PROCEDURE — 2500000004 HC RX 250 GENERAL PHARMACY W/ HCPCS (ALT 636 FOR OP/ED): Mod: JZ | Performed by: EMERGENCY MEDICINE

## 2025-06-02 PROCEDURE — 85025 COMPLETE CBC W/AUTO DIFF WBC: CPT | Performed by: PHYSICIAN ASSISTANT

## 2025-06-02 PROCEDURE — 96361 HYDRATE IV INFUSION ADD-ON: CPT

## 2025-06-02 PROCEDURE — 81001 URINALYSIS AUTO W/SCOPE: CPT | Performed by: PHYSICIAN ASSISTANT

## 2025-06-02 PROCEDURE — 87631 RESP VIRUS 3-5 TARGETS: CPT | Performed by: PHYSICIAN ASSISTANT

## 2025-06-02 PROCEDURE — 71046 X-RAY EXAM CHEST 2 VIEWS: CPT

## 2025-06-02 PROCEDURE — 87086 URINE CULTURE/COLONY COUNT: CPT | Mod: AHULAB | Performed by: PHYSICIAN ASSISTANT

## 2025-06-02 RX ORDER — FAMOTIDINE 10 MG/ML
20 INJECTION, SOLUTION INTRAVENOUS ONCE
Status: COMPLETED | OUTPATIENT
Start: 2025-06-02 | End: 2025-06-02

## 2025-06-02 RX ORDER — ONDANSETRON HYDROCHLORIDE 2 MG/ML
4 INJECTION, SOLUTION INTRAVENOUS ONCE
Status: COMPLETED | OUTPATIENT
Start: 2025-06-02 | End: 2025-06-02

## 2025-06-02 RX ORDER — METOCLOPRAMIDE 10 MG/1
10 TABLET ORAL EVERY 6 HOURS PRN
Qty: 12 TABLET | Refills: 0 | Status: SHIPPED | OUTPATIENT
Start: 2025-06-02 | End: 2025-06-09

## 2025-06-02 RX ORDER — KETOROLAC TROMETHAMINE 30 MG/ML
15 INJECTION, SOLUTION INTRAMUSCULAR; INTRAVENOUS ONCE
Status: COMPLETED | OUTPATIENT
Start: 2025-06-02 | End: 2025-06-02

## 2025-06-02 RX ADMIN — SODIUM CHLORIDE, POTASSIUM CHLORIDE, SODIUM LACTATE AND CALCIUM CHLORIDE 1000 ML: 600; 310; 30; 20 INJECTION, SOLUTION INTRAVENOUS at 05:31

## 2025-06-02 RX ADMIN — FAMOTIDINE 20 MG: 10 INJECTION, SOLUTION INTRAVENOUS at 05:30

## 2025-06-02 RX ADMIN — ONDANSETRON 4 MG: 2 INJECTION, SOLUTION INTRAMUSCULAR; INTRAVENOUS at 05:29

## 2025-06-02 RX ADMIN — KETOROLAC TROMETHAMINE 15 MG: 30 INJECTION, SOLUTION INTRAMUSCULAR at 05:30

## 2025-06-02 ASSESSMENT — PAIN SCALES - GENERAL
PAINLEVEL_OUTOF10: 0 - NO PAIN
PAINLEVEL_OUTOF10: 7
PAINLEVEL_OUTOF10: 0 - NO PAIN
PAINLEVEL_OUTOF10: 8
PAINLEVEL_OUTOF10: 0 - NO PAIN

## 2025-06-02 ASSESSMENT — PAIN DESCRIPTION - LOCATION: LOCATION: NECK

## 2025-06-02 ASSESSMENT — PAIN - FUNCTIONAL ASSESSMENT
PAIN_FUNCTIONAL_ASSESSMENT: 0-10
PAIN_FUNCTIONAL_ASSESSMENT: 0-10

## 2025-06-02 ASSESSMENT — PAIN DESCRIPTION - PAIN TYPE: TYPE: PHANTOM PAIN

## 2025-06-02 NOTE — DISCHARGE INSTRUCTIONS
Your workup was reassuring. We suspect your symptoms are likely due to a viral illness.  Monitor symptoms closely.  Follow-up with your primary care doctor.  Drink plenty of fluids, continue taking Tylenol and ibuprofen, and a prescription for nausea medicine was sent to your pharmacy. Return to the ED with worsening symptoms.  If you continue to have diarrhea, take a stool sample to your primary doctor for cdif testing.

## 2025-06-02 NOTE — ED TRIAGE NOTES
Pt is having complaints of sore throat/cold/migraine/decrease food intake since Friday. Pt states she tested four times for COVID-19 all negative. Pt also stated she being treat for UTI and is tasking antibiotics.

## 2025-06-02 NOTE — PROGRESS NOTES
Patient is a 80-year-old female who presented to the emergency room for multiple complaints.  She was initially seen and evaluated by Dr. Bernal with plans to discharge pending chest x-ray read.  Please see her initial physician note for details.  Chest x-ray did not reveal any acute emergent findings.  As such we will discharge patient per previous physician plan to follow-up with primary care doctor.  Patient is thoroughly educated on supportive care at home as well as signs and symptoms that should prompt immediate return to emergency room.      Josselyn Palacios MD

## 2025-06-02 NOTE — ED PROVIDER NOTES
Emergency Department Provider Note             History of Present Illness   CC: Vomiting, Headache, and Sore Throat    History provided by: Patient  Limitations to History: None  External Records Reviewed: prior ED provider notes, clinic notes, discharge summaries    HPI:  Irasema Jaime is a 80-year-old female with history including hyperlipidemia, IBS, GERD, migraines, anxiety, recurrent UTIs, chronic diarrhea presenting to the emergency department for sore throat, headache of insidious onset, nausea, vomiting, decreased oral intake. Has been on multiple antibiotics recently for UTI. Denies urinary symptoms currently. States she had temperatures as high as 100F. Denies chest pain, dyspnea, abdominal pain, focal deficits.      Physical Exam   Triage vitals:  T 36.6 °C (97.9 °F)  HR 77  /64  RR 16  O2 96 % None (Room air)    General: awake, well-appearing, no distress  Head: normocephalic, atraumatic  Eyes: pupils equal, extraocular movements grossly intact, no conjunctival injection or scleral icterus  ENT: nares patent, moist mucous membranes, no nuchal rigidity, no posterior oropharyngeal erythema, edema, exudates. Uvula is midline  Neck: supple, trachea midline, no masses  CV: regular rate and rhythm, well-perfused  Resp: breathing is non-labored, speaking in full sentences. Lungs are clear to auscultation bilaterally  GI: soft, non-distended, non-tender, no rebound or guarding  Extremities: no edema, no gross deformity   Neuro: alert, oriented, speech is fluent, face is symmetric, moving all extremities   Psych: Appropriate mood and affect    ED Course & Medical Decision Making     80-year-old female with history including hyperlipidemia, IBS, GERD, migraines, anxiety, recurrent UTIs, chronic diarrhea presenting to the emergency department for sore throat, headache of insidious onset, nausea, vomiting, decreased oral intake. She's well-appearing, no distress. Posterior oropharynx is reassuring.  Abdominal exam is unremarkable. She was given symptomatic management. Through shared decision making will hold off on abdominal imaging; I have low suspicion for an acute process and suspect this is likely a viral illness. Labs are unremarkable.  No evidence of UTI.  I independently reviewed her chest x-ray which shows no significant acute process. She reports feeling improved on reevaluation. I discussed results and advised supportive care and follow up with her doctor. Prescription for zofran was sent to her pharmacy. Discussed return precautions.     Social Determinants Limiting Care: None identified    Results: Independently reviewed and interpreted by me. Please see ED course and MDM for my full interpretation.     Chronic Medical Conditions Significantly Affecting Care: as per MDM    Patient was discussed with the following consultants/services: None     Care Considerations: as per [facility]    Diagnoses as of 06/03/25 1735   Nausea and vomiting, unspecified vomiting type   Acute nonintractable headache, unspecified headache type   Acute cough   Viral pharyngitis       Disposition   Discharge    MD Marylou Subramanian MD  06/03/25 1737

## 2025-06-03 LAB — BACTERIA UR CULT: NORMAL

## 2025-06-04 LAB — S PYO THROAT QL CULT: NORMAL

## 2025-06-06 ENCOUNTER — OFFICE VISIT (OUTPATIENT)
Dept: UROLOGY | Facility: CLINIC | Age: 80
End: 2025-06-06
Payer: MEDICARE

## 2025-06-06 VITALS — HEIGHT: 67 IN | WEIGHT: 195 LBS | BODY MASS INDEX: 30.61 KG/M2

## 2025-06-06 DIAGNOSIS — N39.0 RECURRENT UTI: ICD-10-CM

## 2025-06-06 DIAGNOSIS — N39.0 RECURRENT UTI: Primary | ICD-10-CM

## 2025-06-06 PROCEDURE — G2211 COMPLEX E/M VISIT ADD ON: HCPCS | Performed by: STUDENT IN AN ORGANIZED HEALTH CARE EDUCATION/TRAINING PROGRAM

## 2025-06-06 PROCEDURE — 1159F MED LIST DOCD IN RCRD: CPT | Performed by: STUDENT IN AN ORGANIZED HEALTH CARE EDUCATION/TRAINING PROGRAM

## 2025-06-06 PROCEDURE — 1036F TOBACCO NON-USER: CPT | Performed by: STUDENT IN AN ORGANIZED HEALTH CARE EDUCATION/TRAINING PROGRAM

## 2025-06-06 PROCEDURE — 99214 OFFICE O/P EST MOD 30 MIN: CPT | Performed by: STUDENT IN AN ORGANIZED HEALTH CARE EDUCATION/TRAINING PROGRAM

## 2025-06-06 RX ORDER — CEPHALEXIN 250 MG/1
250 CAPSULE ORAL 4 TIMES DAILY
Qty: 20 CAPSULE | Refills: 0 | Status: SHIPPED | OUTPATIENT
Start: 2025-06-06 | End: 2025-06-11

## 2025-06-06 ASSESSMENT — ENCOUNTER SYMPTOMS: FEVER: 0

## 2025-06-06 NOTE — PROGRESS NOTES
History Of Present Illness  Irasema Jaime is a 80 y.o. female presenting with hx recurrent UTI (positive cultures Sept 2023, Oct 2023, April 2024) in the context of postmenopausal status. She normally gets tested at [facility] or [facility] doctor who is doing urine dip. Current prophylaxis: vaginal estrogen.  Not sexually active since August. Normally her sx are urgency, joint pain, headache. Currently experiencing dysuria and burning. Has had recent positive cultures at her [facility] PCP although I am unable to see records she is able to name organisms. Reports she has had prior cystoscopies that were normal.     Has not been tolerating Bactrim due to diarrhea recently, although has been taking 1 tablet instead of 0.5 tablet  Allergies: latex, levofloxacin, nitrofurantoin      Renal function: normal Nov 2024  A1C: glucose wnl 2024  Renal stone hx: no  PVR at prior visit: 100  Hx breast lumpectomy for benign disease    Past Medical History  She has a past medical history of Acute pharyngitis, unspecified, Asthma, IBS (irritable bowel syndrome), Migraine with aura, and Personal history of other diseases of the digestive system.    She has no past medical history of Personal history of irradiation.    Surgical History  She has a past surgical history that includes Cholecystectomy (08/25/2015); Breast cyst aspiration (Many); Breast lumpectomy (2010.  ?); Breast biopsy (Many); and Breast cyst excision (i dont remember).     Social History  She reports that she has never smoked. She has never used smokeless tobacco. She reports that she does not drink alcohol and does not use drugs.    Family History  Family History[1]     Allergies  Levofloxacin, Phenylephrine compound, Protonix [pantoprazole], Tegaserod, Latex, and Nitrofurantoin monohyd/m-cryst    Review of Systems   Constitutional:  Negative for fever.   All other systems reviewed and are negative.       Physical Exam  Con: awake, alert, NAD  HEENT:  "normocephalic, speech normal  CV: no peripheral edema  Resp: no increased work of breathing  Neuro: normal mentation  Psych: mood normal  Skin: no rash         Last Recorded Vitals  Height 1.702 m (5' 7\"), weight 88.5 kg (195 lb), not currently breastfeeding.    Relevant Results    No results found for this or any previous visit (from the past 24 hours).    Assessment/Plan     UTI prophylaxis:  Continue  Vaginal estrogen  Not able to tolerate Hiprex or D mannose due to GI distress. Will plan for cranberry 500mg - 1000mg daily  3 months of prophylactic Bactrim  Standing urine cultures  She dropped off a urine sample yesterday due to symptoms. Empiric tx prescribed while awaiting culture    Emilee Chu MD, Gynecologist  Female Reconstruction & Sexual Medicine Fellow  Dept of Urology/OBGYN  6/6/2025              [1]   Family History  Problem Relation Name Age of Onset    Dementia Mother      Kidney cancer Father      Melanoma Father      Alzheimer's disease Father       "

## 2025-06-07 LAB — BACTERIA UR CULT: NORMAL

## 2025-06-12 RX ORDER — TRIMETHOPRIM 100 MG/1
100 TABLET ORAL DAILY
Qty: 30 TABLET | Refills: 2 | Status: SHIPPED | OUTPATIENT
Start: 2025-06-12 | End: 2025-09-10

## 2025-06-12 RX ORDER — ESTRADIOL 10 UG/1
10 TABLET, FILM COATED VAGINAL 2 TIMES WEEKLY
Qty: 8 TABLET | Refills: 11 | Status: SHIPPED | OUTPATIENT
Start: 2025-06-26

## 2025-06-12 RX ORDER — ESTRADIOL 10 UG/1
10 TABLET, FILM COATED VAGINAL DAILY
Qty: 14 TABLET | Refills: 0 | Status: SHIPPED | OUTPATIENT
Start: 2025-06-12

## 2025-06-13 DIAGNOSIS — N39.0 RECURRENT UTI: ICD-10-CM

## 2025-06-20 ENCOUNTER — TELEPHONE (OUTPATIENT)
Dept: UROLOGY | Facility: CLINIC | Age: 80
End: 2025-06-20
Payer: MEDICARE

## 2025-06-20 DIAGNOSIS — N39.0 RECURRENT UTI: ICD-10-CM

## 2025-06-20 NOTE — TELEPHONE ENCOUNTER
Received fax of patient records for positive urine cultures (see media tab)    Positive culture 3/17/25, pansensitive  Negative culture 4/1/25  Positive culture 5/29/25, only resistant to ampicillin otherwise pansensitive    Emilee Chu MD, Gynecologist  Female Reconstruction & Sexual Medicine Fellow  Dept of Urology/OBGYN  6/20/2025

## 2025-06-25 ENCOUNTER — TELEPHONE (OUTPATIENT)
Dept: UROLOGY | Facility: CLINIC | Age: 80
End: 2025-06-25
Payer: MEDICARE

## 2025-06-25 NOTE — TELEPHONE ENCOUNTER
Attempted to return patient's 's call regarding issue with getting an appointment for referral placed. Unable to leave voicemail.

## 2025-06-27 DIAGNOSIS — N39.0 RECURRENT UTI: ICD-10-CM

## 2025-06-27 LAB — BACTERIA UR CULT: NORMAL

## 2025-06-30 ENCOUNTER — APPOINTMENT (OUTPATIENT)
Facility: CLINIC | Age: 80
End: 2025-06-30
Payer: MEDICARE

## 2025-06-30 VITALS — HEIGHT: 67 IN | BODY MASS INDEX: 30.29 KG/M2 | WEIGHT: 193 LBS | HEART RATE: 72 BPM

## 2025-06-30 DIAGNOSIS — R10.12 LUQ PAIN: ICD-10-CM

## 2025-06-30 DIAGNOSIS — K58.0 IRRITABLE BOWEL SYNDROME WITH DIARRHEA: Primary | ICD-10-CM

## 2025-06-30 PROCEDURE — 99213 OFFICE O/P EST LOW 20 MIN: CPT | Performed by: STUDENT IN AN ORGANIZED HEALTH CARE EDUCATION/TRAINING PROGRAM

## 2025-06-30 PROCEDURE — 1159F MED LIST DOCD IN RCRD: CPT | Performed by: STUDENT IN AN ORGANIZED HEALTH CARE EDUCATION/TRAINING PROGRAM

## 2025-06-30 PROCEDURE — 1160F RVW MEDS BY RX/DR IN RCRD: CPT | Performed by: STUDENT IN AN ORGANIZED HEALTH CARE EDUCATION/TRAINING PROGRAM

## 2025-06-30 PROCEDURE — 1036F TOBACCO NON-USER: CPT | Performed by: STUDENT IN AN ORGANIZED HEALTH CARE EDUCATION/TRAINING PROGRAM

## 2025-06-30 ASSESSMENT — ENCOUNTER SYMPTOMS
CHILLS: 0
BLOOD IN STOOL: 0
ABDOMINAL PAIN: 1
FEVER: 0
TROUBLE SWALLOWING: 0
ABDOMINAL DISTENTION: 1
RECTAL PAIN: 0
DIARRHEA: 1
UNEXPECTED WEIGHT CHANGE: 0
NAUSEA: 0
VOMITING: 0
SHORTNESS OF BREATH: 0
COLOR CHANGE: 0
CONSTIPATION: 1
ANAL BLEEDING: 0

## 2025-06-30 NOTE — ASSESSMENT & PLAN NOTE
Most recent EGD and colonoscopy done March 2023 which were normal with the exception of diverticulosis in the sigmoid colon.  Has intermittent diarrhea usually related to antibiotic use.  Bowel movements are 2-3 times a week normal [facility] 4 constipated [facility] 1 diarrhea [facility] 7.  History of SIBO treated with Xifaxan in the past.  - KUB to assess stool burden, sxs could be related to constipation  - Recommend fiber and MiraLAX based on KUB  - Probiotic rich diet  - Can try homeopathic oil of oregano for SIBO symptoms given cost of rifaximin      Orders:    XR abdomen 1 view; Future

## 2025-06-30 NOTE — PROGRESS NOTES
Chief Complaint:  Chief Complaint   Patient presents with    [facility]       Irasema Jaime is a 80 y.o. year old female patient with Medical History[1]  referred to establish care.       Recently saw Robbie Sanchez NP 5/2025 and prescribed Rifaximin for GI sxs but did not  due to cost.     Hx of recurrent UTIs status post multiple courses of antibiotics. Constant diarrhea while on antibiotics.    Was recently prescribed Trimethoprim for 90 days. Has not been on for 3 days and no longer has diarrhea    Hx of SIBO treated with Xifaxin, in 2010's, mild EPI sometimes uses Creon which helps, but costly. She tries to watch her diet.     Had PICC line years ago at 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 Had surveillance MRI and most recently told she does not need follow-up    Currently she is feeling well.  Has some mild intermittent left upper quadrant discomfort after eating, from front to back.  Sometimes needs to push stool through with abdominal massage.  Bloating generalized, described as distention.  Right ovarian cyst scheduled for oophorectomy     A good normal week she has 2-3 BM per week,   Normal BM 4  Constipated 1  Diarrhea 7    Used psyllium husk, metamucil and used daily but eventually stopped working, Was using daily   Miralax 2x/month   Uses Align probiotic   Has tried low FODMAP diet modifications notices she is intolerant to yeast and sugar alcohols    Fruit: at least 3/day  Veggies: 2/day  Whole Grains: limited  Water: at least 8 cups    Post- [facility] which was open    EGD/COLONOSCOPY 3/2023  EGD for follow-up of reflux esophagitis with patulous LES  Colon with sigmoid diverticulosis, no repeat given age     Review of Systems   Constitutional:  Negative for chills, fever and unexpected weight change.   HENT:  Negative for trouble swallowing.    Respiratory:  Negative for shortness of breath.   "  Cardiovascular:  Negative for chest pain.   Gastrointestinal:  Positive for abdominal distention, abdominal pain, constipation and diarrhea. Negative for anal bleeding, blood in stool, nausea, rectal pain and vomiting.   Skin:  Negative for color change.     Family History[2]    Medications  Current Medications[3]    Vitals  Pulse 72   Ht 1.702 m (5' 7\")   Wt 87.5 kg (193 lb)   LMP  (LMP Unknown)   BMI 30.23 kg/m²     Physical Exam  Constitutional:       General: She is not in acute distress.  HENT:      Head: Normocephalic and atraumatic.   Eyes:      General: No scleral icterus.     Conjunctiva/sclera: Conjunctivae normal.   Cardiovascular:      Rate and Rhythm: Normal rate.      Heart sounds: Normal heart sounds.   Pulmonary:      Effort: Pulmonary effort is normal.      Breath sounds: No wheezing.   Abdominal:      General: Bowel sounds are normal. There is no distension.      Palpations: Abdomen is soft.      Tenderness: There is no abdominal tenderness. There is no guarding or rebound.      Hernia: No hernia is present.      Comments: Well-healed abdominal scar   Skin:     General: Skin is warm.      Coloration: Skin is not jaundiced.   Neurological:      General: No focal deficit present.      Mental Status: She is alert and oriented to person, place, and time.   Psychiatric:         Mood and Affect: Mood normal.           Labs:  No results found for: \"AFP\"No results found for: \"ASMAB\", \"MITOAB\"No results found for: \"JOSE DAVID\"No results found for: \"ASMAB\", \"MITOAB\"No results found for: \"ODIXOGLU97\"No results found for: \"HEPCAB\"No results found for: \"HEPATOT\", \"HEPAIGM\", \"HEPBCIGM\", \"HEPBCAB\", \"HBEAG\", \"HEPCAB\"No results found for: \"HIV1X2\"No results found for: \"IRON\", \"TIBC\", \"FERRITIN\"No results found for: \"INR\", \"PROTIME\"  Lab Results   Component Value Date    TSH 1.60 04/22/2021     Computed MELD 3.0 unavailable. One or more values for this score either were not found within the given timeframe or did not " fit some other criterion.  Computed MELD-Na unavailable. One or more values for this score either were not found within the given timeframe or did not fit some other criterion.     FIB-4 Calculation: 1.28 at 6/2/2025  4:37 AM  Calculated from:  SGOT/AST: 15 U/L at 6/2/2025  4:37 AM  SGPT/ALT: 11 U/L at 6/2/2025  4:37 AM  Platelets: 276 x10*3/uL at 6/2/2025  4:37 AM  Age: 80 years     Radiology         A/P   Irasema was seen today for establish care.  Diagnoses and all orders for this visit:  Irritable bowel syndrome with diarrhea (Primary)  -     XR abdomen 1 view; Future     Assessment & Plan  Irritable bowel syndrome with diarrhea  Most recent EGD and colonoscopy done March 2023 which were normal with the exception of diverticulosis in the sigmoid colon.  Has intermittent diarrhea usually related to antibiotic use.  Bowel movements are 2-3 times a week normal [place] 4 constipated [facility] 1 diarrhea [place] 7.  History of SIBO treated with Xifaxan in the past.  - KUB to assess stool burden, sxs could be related to constipation  - Recommend fiber and MiraLAX based on KUB  - Probiotic rich diet  - Can try homeopathic oil of oregano for SIBO symptoms given cost of rifaximin      Orders:    XR abdomen 1 view; Future    LUQ pain  After eating, could be acid related vs constipation, egd/colon done and normal 2023  - KUB   - trial of pepcid, has ppi allergy                   [1]   Past Medical History:  Diagnosis Date    Acute pharyngitis, unspecified     Sore throat    Asthma     IBS (irritable bowel syndrome)     Migraine with aura     Personal history of other diseases of the digestive system     History of esophageal reflux   [2]   Family History  Problem Relation Name Age of Onset    Dementia Mother      Kidney cancer Father      Melanoma Father      Alzheimer's disease Father     [3]   Current Outpatient Medications:     cholecalciferol, vitamin D3, 50 mcg (2,000 unit) tablet,chewable, Chew., Disp: , Rfl:      EPINEPHrine 0.3 mg/0.3 mL injection syringe, Inject 0.3 mL (0.3 mg) into the muscle if needed for anaphylaxis. Inject into upper leg. Call 911 after use., Disp: 1 each, Rfl: 0    estradiol (Estrace) 0.01 % (0.1 mg/gram) vaginal cream, Apply 0.5g (blueberry size amount) to vaginal opening nightly 2 - 3 times per week, Disp: 42.5 g, Rfl: 11    estradiol (Vagifem) 10 mcg tablet vaginal tablet, Insert 1 tablet (10 mcg) into the vagina 2 times a week. Do not fill before June 26, 2025., Disp: 8 tablet, Rfl: 11    sulfamethoxazole-trimethoprim (Bactrim) 400-80 mg tablet, Take 0.5 tablets by mouth 3 (three) times a week., Disp: 18 tablet, Rfl: 0    trimethoprim (Trimpex) 100 mg tablet, Take 1 tablet (100 mg) by mouth once daily., Disp: 30 tablet, Rfl: 2    UNABLE TO FIND, Culturelle probiotic, Disp: , Rfl:

## 2025-06-30 NOTE — PATIENT INSTRUCTIONS
For Miralax and Fiber    Take 1 capful/1 packet of Miralax at night with 8oz of liquid as needed for constipation     Start a fiber supplement daily. Suggest Benefiber or Cirtrucel. Start low and increase dose slow. Start with 1 tsp/ 1 capsule/ 1 gummy of fiber each day. Must drink atleast 8 cups of water throughout the day. Can increase the dose of fiber after 1 week of daily use. It is important to take this DAILY.     Fiber is what helps keep stool moving throughout the colon and keeps stool from being too hard or too loose!    GOAL fiber intake 25 to 30 grams daily     Trial Pepcid 20-40mg as needed for left upper abdominal pain after eating     Eat pro-biotic rich foods    For symptoms of SIBO you can try OIL OF OREGANO    200mg capsules 2-3x a day for 4-6 weeks

## 2025-06-30 NOTE — ASSESSMENT & PLAN NOTE
After eating, could be acid related vs constipation, egd/colon done and normal 2023  - KUB   - trial of pepcid, has ppi allergy

## 2025-07-01 ENCOUNTER — HOSPITAL ENCOUNTER (OUTPATIENT)
Dept: RADIOLOGY | Facility: HOSPITAL | Age: 80
Discharge: HOME | End: 2025-07-01
Payer: MEDICARE

## 2025-07-01 DIAGNOSIS — K58.0 IRRITABLE BOWEL SYNDROME WITH DIARRHEA: ICD-10-CM

## 2025-07-01 PROCEDURE — 74018 RADEX ABDOMEN 1 VIEW: CPT | Performed by: RADIOLOGY

## 2025-07-01 PROCEDURE — 74018 RADEX ABDOMEN 1 VIEW: CPT

## 2025-07-04 DIAGNOSIS — N39.0 RECURRENT UTI: ICD-10-CM

## 2025-07-11 DIAGNOSIS — N39.0 RECURRENT UTI: ICD-10-CM

## 2025-07-18 DIAGNOSIS — N39.0 RECURRENT UTI: ICD-10-CM

## 2025-07-25 DIAGNOSIS — N39.0 RECURRENT UTI: ICD-10-CM

## 2025-08-01 DIAGNOSIS — N39.0 RECURRENT UTI: ICD-10-CM

## 2025-08-08 DIAGNOSIS — N39.0 RECURRENT UTI: ICD-10-CM

## 2025-08-15 DIAGNOSIS — N39.0 RECURRENT UTI: ICD-10-CM

## 2025-08-22 DIAGNOSIS — N39.0 RECURRENT UTI: ICD-10-CM

## 2025-08-23 ENCOUNTER — HOSPITAL ENCOUNTER (EMERGENCY)
Facility: HOSPITAL | Age: 80
Discharge: HOME | End: 2025-08-23
Payer: MEDICARE

## 2025-08-23 ENCOUNTER — APPOINTMENT (OUTPATIENT)
Dept: RADIOLOGY | Facility: HOSPITAL | Age: 80
End: 2025-08-23
Payer: MEDICARE

## 2025-08-23 VITALS
TEMPERATURE: 99.1 F | SYSTOLIC BLOOD PRESSURE: 134 MMHG | HEART RATE: 86 BPM | OXYGEN SATURATION: 95 % | RESPIRATION RATE: 18 BRPM | DIASTOLIC BLOOD PRESSURE: 76 MMHG

## 2025-08-23 DIAGNOSIS — R05.1 ACUTE COUGH: ICD-10-CM

## 2025-08-23 DIAGNOSIS — N39.0 RECURRENT UTI: ICD-10-CM

## 2025-08-23 DIAGNOSIS — B34.9 VIRAL ILLNESS: Primary | ICD-10-CM

## 2025-08-23 LAB
APPEARANCE UR: CLEAR
BILIRUB UR STRIP.AUTO-MCNC: NEGATIVE MG/DL
COLOR UR: NORMAL
FLUAV RNA RESP QL NAA+PROBE: NOT DETECTED
FLUBV RNA RESP QL NAA+PROBE: NOT DETECTED
GLUCOSE UR STRIP.AUTO-MCNC: NORMAL MG/DL
KETONES UR STRIP.AUTO-MCNC: NEGATIVE MG/DL
LEUKOCYTE ESTERASE UR QL STRIP.AUTO: NEGATIVE
NITRITE UR QL STRIP.AUTO: NEGATIVE
PH UR STRIP.AUTO: 6 [PH]
PROT UR STRIP.AUTO-MCNC: NEGATIVE MG/DL
RBC # UR STRIP.AUTO: NEGATIVE MG/DL
RSV RNA RESP QL NAA+PROBE: NOT DETECTED
SARS-COV-2 RNA RESP QL NAA+PROBE: NOT DETECTED
SP GR UR STRIP.AUTO: 1.01
UROBILINOGEN UR STRIP.AUTO-MCNC: NORMAL MG/DL

## 2025-08-23 PROCEDURE — 99284 EMERGENCY DEPT VISIT MOD MDM: CPT | Mod: 25

## 2025-08-23 PROCEDURE — 87637 SARSCOV2&INF A&B&RSV AMP PRB: CPT | Performed by: PHYSICIAN ASSISTANT

## 2025-08-23 PROCEDURE — 71046 X-RAY EXAM CHEST 2 VIEWS: CPT

## 2025-08-23 PROCEDURE — 81003 URINALYSIS AUTO W/O SCOPE: CPT | Performed by: PHYSICIAN ASSISTANT

## 2025-08-23 PROCEDURE — 71046 X-RAY EXAM CHEST 2 VIEWS: CPT | Mod: FOREIGN READ | Performed by: RADIOLOGY

## 2025-08-23 RX ORDER — ALBUTEROL SULFATE 90 UG/1
2 INHALANT RESPIRATORY (INHALATION) EVERY 4 HOURS PRN
Qty: 18 G | Refills: 0 | Status: SHIPPED | OUTPATIENT
Start: 2025-08-23 | End: 2025-09-22

## 2025-08-23 ASSESSMENT — PAIN - FUNCTIONAL ASSESSMENT: PAIN_FUNCTIONAL_ASSESSMENT: 0-10

## 2025-08-23 ASSESSMENT — PAIN SCALES - GENERAL: PAINLEVEL_OUTOF10: 0 - NO PAIN

## 2025-08-28 ENCOUNTER — APPOINTMENT (OUTPATIENT)
Dept: UROLOGY | Facility: CLINIC | Age: 80
End: 2025-08-28
Payer: MEDICARE

## 2025-08-28 VITALS
HEART RATE: 63 BPM | SYSTOLIC BLOOD PRESSURE: 136 MMHG | HEIGHT: 67 IN | WEIGHT: 191.6 LBS | DIASTOLIC BLOOD PRESSURE: 75 MMHG | BODY MASS INDEX: 30.07 KG/M2 | TEMPERATURE: 97.4 F

## 2025-08-28 DIAGNOSIS — R19.7 DIARRHEA, UNSPECIFIED TYPE: ICD-10-CM

## 2025-08-28 DIAGNOSIS — N39.0 RECURRENT UTI: Primary | ICD-10-CM

## 2025-08-28 LAB
POC APPEARANCE, URINE: CLEAR
POC BILIRUBIN, URINE: NEGATIVE
POC BLOOD, URINE: NEGATIVE
POC COLOR, URINE: YELLOW
POC GLUCOSE, URINE: NEGATIVE MG/DL
POC KETONES, URINE: NEGATIVE MG/DL
POC LEUKOCYTES, URINE: NEGATIVE
POC NITRITE,URINE: NEGATIVE
POC PH, URINE: 7 PH
POC PROTEIN, URINE: NEGATIVE MG/DL
POC SPECIFIC GRAVITY, URINE: 1.01
POC UROBILINOGEN, URINE: 0.2 EU/DL

## 2025-08-28 PROCEDURE — 1125F AMNT PAIN NOTED PAIN PRSNT: CPT | Performed by: OBSTETRICS & GYNECOLOGY

## 2025-08-28 PROCEDURE — 1036F TOBACCO NON-USER: CPT | Performed by: OBSTETRICS & GYNECOLOGY

## 2025-08-28 PROCEDURE — 81003 URINALYSIS AUTO W/O SCOPE: CPT | Performed by: OBSTETRICS & GYNECOLOGY

## 2025-08-28 PROCEDURE — 1159F MED LIST DOCD IN RCRD: CPT | Performed by: OBSTETRICS & GYNECOLOGY

## 2025-08-28 PROCEDURE — 99214 OFFICE O/P EST MOD 30 MIN: CPT | Performed by: OBSTETRICS & GYNECOLOGY

## 2025-08-28 RX ORDER — ESTRADIOL 10 UG/1
10 TABLET, FILM COATED VAGINAL 2 TIMES WEEKLY
Qty: 8 TABLET | Refills: 11 | Status: SHIPPED | OUTPATIENT
Start: 2025-08-28

## 2025-08-28 ASSESSMENT — PAIN SCALES - GENERAL: PAINLEVEL_OUTOF10: 10-WORST PAIN EVER

## 2025-08-29 DIAGNOSIS — N39.0 RECURRENT UTI: ICD-10-CM

## 2025-09-02 ENCOUNTER — OFFICE VISIT (OUTPATIENT)
Dept: OBSTETRICS AND GYNECOLOGY | Facility: CLINIC | Age: 80
End: 2025-09-02
Payer: MEDICARE

## 2025-09-02 VITALS
SYSTOLIC BLOOD PRESSURE: 127 MMHG | BODY MASS INDEX: 30.23 KG/M2 | DIASTOLIC BLOOD PRESSURE: 81 MMHG | HEART RATE: 65 BPM | WEIGHT: 192.6 LBS | HEIGHT: 67 IN

## 2025-09-02 DIAGNOSIS — N83.201 RIGHT OVARIAN CYST: Primary | ICD-10-CM

## 2025-09-02 LAB — C DIFF TOX GENS STL QL NAA+PROBE: NOT DETECTED

## 2025-09-02 PROCEDURE — 1126F AMNT PAIN NOTED NONE PRSNT: CPT | Performed by: STUDENT IN AN ORGANIZED HEALTH CARE EDUCATION/TRAINING PROGRAM

## 2025-09-02 PROCEDURE — 99212 OFFICE O/P EST SF 10 MIN: CPT | Performed by: STUDENT IN AN ORGANIZED HEALTH CARE EDUCATION/TRAINING PROGRAM

## 2025-09-02 PROCEDURE — 99204 OFFICE O/P NEW MOD 45 MIN: CPT | Performed by: STUDENT IN AN ORGANIZED HEALTH CARE EDUCATION/TRAINING PROGRAM

## 2025-09-02 ASSESSMENT — ENCOUNTER SYMPTOMS
RESPIRATORY NEGATIVE: 0
PSYCHIATRIC NEGATIVE: 0
EYES NEGATIVE: 0
CONSTITUTIONAL NEGATIVE: 0
MUSCULOSKELETAL NEGATIVE: 0
CARDIOVASCULAR NEGATIVE: 0
ENDOCRINE NEGATIVE: 0
NEUROLOGICAL NEGATIVE: 0
HEMATOLOGIC/LYMPHATIC NEGATIVE: 0
ALLERGIC/IMMUNOLOGIC NEGATIVE: 0
GASTROINTESTINAL NEGATIVE: 0

## 2025-09-02 ASSESSMENT — PAIN SCALES - GENERAL: PAINLEVEL_OUTOF10: 0-NO PAIN

## 2025-09-04 ENCOUNTER — CONSULT (OUTPATIENT)
Dept: ALLERGY | Facility: CLINIC | Age: 80
End: 2025-09-04
Payer: MEDICARE

## 2025-09-04 VITALS — SYSTOLIC BLOOD PRESSURE: 134 MMHG | WEIGHT: 193 LBS | BODY MASS INDEX: 30.23 KG/M2 | DIASTOLIC BLOOD PRESSURE: 89 MMHG

## 2025-09-04 DIAGNOSIS — J31.0 CHRONIC RHINITIS: Primary | ICD-10-CM

## 2025-09-04 DIAGNOSIS — D84.9 IMMUNODEFICIENCY DISEASE (MULTI): ICD-10-CM

## 2025-09-04 PROCEDURE — 95004 PERQ TESTS W/ALRGNC XTRCS: CPT | Performed by: ALLERGY & IMMUNOLOGY

## 2025-09-04 PROCEDURE — 99204 OFFICE O/P NEW MOD 45 MIN: CPT | Performed by: ALLERGY & IMMUNOLOGY

## 2025-09-11 ENCOUNTER — APPOINTMENT (OUTPATIENT)
Dept: RADIOLOGY | Facility: CLINIC | Age: 80
End: 2025-09-11
Payer: MEDICARE

## 2025-12-30 ENCOUNTER — APPOINTMENT (OUTPATIENT)
Facility: CLINIC | Age: 80
End: 2025-12-30
Payer: MEDICARE

## 2026-01-08 ENCOUNTER — APPOINTMENT (OUTPATIENT)
Dept: UROLOGY | Facility: CLINIC | Age: 81
End: 2026-01-08
Payer: MEDICARE